# Patient Record
Sex: MALE | Race: WHITE | NOT HISPANIC OR LATINO | URBAN - METROPOLITAN AREA
[De-identification: names, ages, dates, MRNs, and addresses within clinical notes are randomized per-mention and may not be internally consistent; named-entity substitution may affect disease eponyms.]

---

## 2023-12-16 ENCOUNTER — HOSPITAL ENCOUNTER (OUTPATIENT)
Dept: RADIOLOGY | Facility: MEDICAL CENTER | Age: 48
End: 2023-12-16

## 2023-12-16 ENCOUNTER — HOSPITAL ENCOUNTER (EMERGENCY)
Facility: MEDICAL CENTER | Age: 48
End: 2023-12-17
Attending: STUDENT IN AN ORGANIZED HEALTH CARE EDUCATION/TRAINING PROGRAM

## 2023-12-16 VITALS
OXYGEN SATURATION: 93 % | TEMPERATURE: 98.7 F | RESPIRATION RATE: 16 BRPM | BODY MASS INDEX: 25.31 KG/M2 | DIASTOLIC BLOOD PRESSURE: 80 MMHG | HEART RATE: 93 BPM | SYSTOLIC BLOOD PRESSURE: 118 MMHG | HEIGHT: 68 IN | WEIGHT: 167 LBS

## 2023-12-16 DIAGNOSIS — J36 PERITONSILLAR ABSCESS: Primary | ICD-10-CM

## 2023-12-16 LAB
ALBUMIN SERPL BCP-MCNC: 4.4 G/DL (ref 3.2–4.9)
ALBUMIN/GLOB SERPL: 1.6 G/DL
ALP SERPL-CCNC: 73 U/L (ref 30–99)
ALT SERPL-CCNC: 30 U/L (ref 2–50)
ANION GAP SERPL CALC-SCNC: 11 MMOL/L (ref 7–16)
AST SERPL-CCNC: 17 U/L (ref 12–45)
BASOPHILS # BLD AUTO: 0.2 % (ref 0–1.8)
BASOPHILS # BLD: 0.02 K/UL (ref 0–0.12)
BILIRUB SERPL-MCNC: 0.9 MG/DL (ref 0.1–1.5)
BUN SERPL-MCNC: 14 MG/DL (ref 8–22)
CALCIUM ALBUM COR SERPL-MCNC: 9.1 MG/DL (ref 8.5–10.5)
CALCIUM SERPL-MCNC: 9.4 MG/DL (ref 8.5–10.5)
CHLORIDE SERPL-SCNC: 100 MMOL/L (ref 96–112)
CO2 SERPL-SCNC: 23 MMOL/L (ref 20–33)
CREAT SERPL-MCNC: 0.64 MG/DL (ref 0.5–1.4)
EOSINOPHIL # BLD AUTO: 0 K/UL (ref 0–0.51)
EOSINOPHIL NFR BLD: 0 % (ref 0–6.9)
ERYTHROCYTE [DISTWIDTH] IN BLOOD BY AUTOMATED COUNT: 46.2 FL (ref 35.9–50)
GFR SERPLBLD CREATININE-BSD FMLA CKD-EPI: 116 ML/MIN/1.73 M 2
GLOBULIN SER CALC-MCNC: 2.8 G/DL (ref 1.9–3.5)
GLUCOSE SERPL-MCNC: 145 MG/DL (ref 65–99)
HCT VFR BLD AUTO: 43.3 % (ref 42–52)
HGB BLD-MCNC: 15.1 G/DL (ref 14–18)
IMM GRANULOCYTES # BLD AUTO: 0.05 K/UL (ref 0–0.11)
IMM GRANULOCYTES NFR BLD AUTO: 0.4 % (ref 0–0.9)
LYMPHOCYTES # BLD AUTO: 0.56 K/UL (ref 1–4.8)
LYMPHOCYTES NFR BLD: 4.3 % (ref 22–41)
MCH RBC QN AUTO: 32.5 PG (ref 27–33)
MCHC RBC AUTO-ENTMCNC: 34.9 G/DL (ref 32.3–36.5)
MCV RBC AUTO: 93.3 FL (ref 81.4–97.8)
MONOCYTES # BLD AUTO: 0.18 K/UL (ref 0–0.85)
MONOCYTES NFR BLD AUTO: 1.4 % (ref 0–13.4)
NEUTROPHILS # BLD AUTO: 12.08 K/UL (ref 1.82–7.42)
NEUTROPHILS NFR BLD: 93.7 % (ref 44–72)
NRBC # BLD AUTO: 0 K/UL
NRBC BLD-RTO: 0 /100 WBC (ref 0–0.2)
PLATELET # BLD AUTO: 358 K/UL (ref 164–446)
PMV BLD AUTO: 9.4 FL (ref 9–12.9)
POTASSIUM SERPL-SCNC: 4.2 MMOL/L (ref 3.6–5.5)
PROT SERPL-MCNC: 7.2 G/DL (ref 6–8.2)
RBC # BLD AUTO: 4.64 M/UL (ref 4.7–6.1)
SODIUM SERPL-SCNC: 134 MMOL/L (ref 135–145)
WBC # BLD AUTO: 12.9 K/UL (ref 4.8–10.8)

## 2023-12-16 PROCEDURE — 85025 COMPLETE CBC W/AUTO DIFF WBC: CPT

## 2023-12-16 PROCEDURE — 99284 EMERGENCY DEPT VISIT MOD MDM: CPT

## 2023-12-16 PROCEDURE — 36415 COLL VENOUS BLD VENIPUNCTURE: CPT

## 2023-12-16 PROCEDURE — A9270 NON-COVERED ITEM OR SERVICE: HCPCS | Performed by: STUDENT IN AN ORGANIZED HEALTH CARE EDUCATION/TRAINING PROGRAM

## 2023-12-16 PROCEDURE — 700102 HCHG RX REV CODE 250 W/ 637 OVERRIDE(OP): Performed by: STUDENT IN AN ORGANIZED HEALTH CARE EDUCATION/TRAINING PROGRAM

## 2023-12-16 PROCEDURE — 80053 COMPREHEN METABOLIC PANEL: CPT

## 2023-12-16 PROCEDURE — 42700 I&D ABSCESS PERITONSILLAR: CPT

## 2023-12-16 PROCEDURE — 700101 HCHG RX REV CODE 250: Performed by: STUDENT IN AN ORGANIZED HEALTH CARE EDUCATION/TRAINING PROGRAM

## 2023-12-16 RX ORDER — LIDOCAINE HYDROCHLORIDE AND EPINEPHRINE 10; 10 MG/ML; UG/ML
20 INJECTION, SOLUTION INFILTRATION; PERINEURAL ONCE
Status: COMPLETED | OUTPATIENT
Start: 2023-12-16 | End: 2023-12-16

## 2023-12-16 RX ORDER — AMOXICILLIN AND CLAVULANATE POTASSIUM 875; 125 MG/1; MG/1
1 TABLET, FILM COATED ORAL 2 TIMES DAILY
Qty: 14 TABLET | Refills: 0 | Status: ACTIVE | OUTPATIENT
Start: 2023-12-16 | End: 2023-12-23

## 2023-12-16 RX ORDER — AMOXICILLIN AND CLAVULANATE POTASSIUM 875; 125 MG/1; MG/1
1 TABLET, FILM COATED ORAL ONCE
Status: DISCONTINUED | OUTPATIENT
Start: 2023-12-17 | End: 2023-12-17 | Stop reason: HOSPADM

## 2023-12-16 RX ADMIN — LIDOCAINE HYDROCHLORIDE AND EPINEPHRINE 20 ML: 10; 10 INJECTION, SOLUTION INFILTRATION; PERINEURAL at 22:00

## 2023-12-16 RX ADMIN — BENZOCAINE, BUTAMBEN, AND TETRACAINE HYDROCHLORIDE 1 SPRAY: .028; .004; .004 AEROSOL, SPRAY TOPICAL at 22:00

## 2023-12-16 ASSESSMENT — PAIN DESCRIPTION - PAIN TYPE: TYPE: ACUTE PAIN

## 2023-12-17 NOTE — CONSULTS
DATE OF SERVICE:  12/16/2023     PRINCIPAL COMPLAINT:  Sore throat.     HISTORY OF PRESENT ILLNESS:  This is a 48-year-old male who was seen in   [facility] with concern of peritonsillar abscess who scanned there and   noted to have like a 1x0.7 cm abscess in the tonsil with edema of the   surrounding area.  He states he never has had a problem with this before.     MEDICATIONS:  He does not take medications day-to-day.     ALLERGIES:  He has no known drug allergies.     PHYSICAL EXAMINATION:  GENERAL:  He is sitting in the bed, in no distress.  He is complaining of some   throat pain and slightly muffled voice.  HEENT:  Both ears are grossly normal.  The nose is patent.  The mouth is pink   and moist.  He actually has good opening of the mouth. He does have shift of   the right anterior pillar over the tonsil with some erythema.  NECK:  Soft and supple but he complains of pain with palpation of the neck.    No significant lymphadenopathy noted.     DIAGNOSTIC DATA:  CT scan was reviewed that showed once again a small abscess with some phlegmon around the tonsil in the peritonsillar   area.     PLAN:  Given the findings, recommend that he be drained.  This was discussed   with him and this was done at the bedside without difficulty.  He be sent home   on Augmentin and instructed to make sure he takes all his antibiotics.  He   can follow up with me as needed.        ______________________________  MD MELLISA Tomlin/SANTOS/ASHLEY    DD:  12/16/2023 23:00  DT:  12/16/2023 23:40    Job#:  [id number]

## 2023-12-17 NOTE — DISCHARGE INSTRUCTIONS
Take the antibiotics as prescribed, return if you develop worsening symptoms despite antibiotics, or having difficulty breathing, other new symptoms you find concerning

## 2023-12-17 NOTE — ED PROVIDER NOTES
"ED Provider Note    CHIEF COMPLAINT  Chief Complaint   Patient presents with    Oral Swelling     Pt transferred by EMS from [facility] for R sided peritonsillar abscess.       EXTERNAL RECORDS REVIEWED  External ED Note : [facility], mild leukocytosis, 11.6.    HPI/ROS  LIMITATION TO HISTORY   Select: : None      Rajiv Johansen is a 48 y.o. male who presents for large 7 cm right peritonsillar abscess, sore throat for the past 2 days, mass effect today last p.o. was yesterday evening, coffee today, transferred from [facility] [place] for ENT consultation and drainage received Unasyn and dexamethasone 10 mg prior to transport.  Reports swelling is significantly improved after IV medication.    PAST MEDICAL HISTORY       SURGICAL HISTORY  patient denies any surgical history    FAMILY HISTORY  History reviewed. No pertinent family history.    SOCIAL HISTORY  Social History     Tobacco Use    Smoking status: Every Day     Current packs/day: 0.50     Average packs/day: 0.5 packs/day for 30.0 years (15.0 ttl pk-yrs)     Types: Cigarettes     Start date: 1994    Smokeless tobacco: Never   Substance and Sexual Activity    Alcohol use: Yes     Comment: pt reports distant Hx of alcohol abuse.    Drug use: No    Sexual activity: Not on file       CURRENT MEDICATIONS  Home Medications    **Home medications have not yet been reviewed for this encounter**         ALLERGIES  No Known Allergies    PHYSICAL EXAM  VITAL SIGNS: /80   Pulse 93   Temp 37.1 °C (98.7 °F) (Temporal)   Resp 16   Ht 1.727 m (5' 8\")   Wt 75.8 kg (167 lb)   SpO2 93%   BMI 25.39 kg/m²    General: Pleasant male, no respiratory distress handling secretions  Head: Normocephalic atraumatic  Eyes: Extraocular motion intact  HEENT: Oropharynx is widely patent, there is uvular deviation to the left, with mass effect to the right posterior oropharynx.  No trismus no pain with range of motion of neck  Neck: Supple, no rigidity  Cardiovascular: " Regular rate and rhythm no murmurs rubs or gallops  Respiratory: Clear to auscultation bilaterally, equal chest rise and fall, no increased work of breathing  Abdomen: Soft nontender no guarding  Musculoskeletal: Warm and well perfused, no peripheral edema  Neuro: Alert, no focal deficits  Integumentary: No wounds or rashes      DIAGNOSTIC STUDIES / PROCEDURES        Labs:    Leukocytosis in the setting of infection and steroid use    RADIOLOGY  Reviewed CT neck from outside facility showed large PTA.     COURSE & MEDICAL DECISION MAKING        INITIAL ASSESSMENT, COURSE AND PLAN  Care Narrative: 48-year-old with history of polysubstance use, presenting with sore throat for the past couple days found to have PTA at outside hospital seen here for ENT consult.  Vital signs are reassuring.  Exam is notable for right peritonsillar abscess with no stridor patient tolerating secretions without difficulty.        DISPOSITION AND DISCUSSIONS  I have discussed management of the patient with the following physicians and DELGADO's: Dr. Nicolas, ENT evaluated the patient, she feels imaging is mostly showing reactive edema and not abscess, she performed I&D at the bedside, patient is feeling better and he will be discharged on antibiotics after discussion with consultant.    Patient was handed a paper prescription by myself.  We discussed return precautions patient verbalized understanding and agreement    Discussion of management with other [facility] or appropriate source(s): Pharmacy discussed oral analgesic spray options       Escalation of care considered, and ultimately not performed:acute inpatient care management, however at this time, the patient is most appropriate for outpatient management    Barriers to care at this time, including but not limited to:  Polysubstance use disorder .     Decision tools and prescription drugs considered including, but not limited to: Antibiotics augmentin .    FINAL DIAGNOSIS  1. Peritonsillar  abscess           Electronically signed by: Yomi William M.D., 12/16/2023 10:15 PM

## 2023-12-17 NOTE — ED NOTES
Reviewed discharge instructions, pt verbalized understanding of follow up with ENT and PCP. All Rx/OTC medications reviewed with pt who voices understanding of augmentin use paper Rx in hand. Pt encouraged to return to the ED for fevers, vomiting, inability to swallow, or any other new/concerning symptoms. IV discontinued and pt able to ambulate to the lobby with steady gait. Pt reports his ride is waiting and he does not want to wait for Augmentin in hospital.

## 2023-12-17 NOTE — ED TRIAGE NOTES
"Chief Complaint   Patient presents with    Oral Swelling     Pt transferred by EMS from [facility] for R sided peritonsillar abscess.   /83   Pulse 79   Temp 37.2 °C (98.9 °F) (Temporal)   Resp 18   Ht 1.727 m (5' 8\")   Wt 75.8 kg (167 lb)   SpO2 94%   BMI 25.39 kg/m²     Pt presents with swelling in oropharynx with uvula being pushed to the L. Pt is able to speak in full sentences and able to clear secretions. MD at bedside.   "

## 2023-12-17 NOTE — PROCEDURES
DATE OF PROCEDURE:  12/16/2023     PREPROCEDURE DIAGNOSIS:  Peritonsillar abscess.     POSTPROCEDURE DIAGNOSIS:  Peritonsillar abscess.     PROCEDURE:  Incision and drainage of peritonsillar abscess.     ATTENDING PHYSICIAN:  Erin Nicolas MD     ANESTHESIA:  Local.     PROCEDURE IN DETAIL: The patient was appropriately identified after which he   was sprayed topically with Cetacaine spray.  This was left for several minutes   and then he was injected with 1% lidocaine with epinephrine in the anterior   peritonsillar area.  A total of 6 mL was used.  After allowing for local time,   a small incision was made in the anterior pillar and then opened using a   curved hemostat.  This is opened up into the tonsillar area with small amount   of purulence seen.  He tolerated this well and was instructed to call or   return if problems arise.        ______________________________  Erin Nicolas MD    CWG/SANTOS    DD:  12/16/2023 23:01  DT:  12/16/2023 23:17    Job#:  [id number]

## 2023-12-20 ENCOUNTER — HOSPITAL ENCOUNTER (EMERGENCY)
Facility: MEDICAL CENTER | Age: 48
End: 2023-12-20

## 2024-04-22 ENCOUNTER — APPOINTMENT (OUTPATIENT)
Dept: RADIOLOGY | Facility: MEDICAL CENTER | Age: 49
End: 2024-04-22
Attending: EMERGENCY MEDICINE
Payer: COMMERCIAL

## 2024-04-22 ENCOUNTER — HOSPITAL ENCOUNTER (EMERGENCY)
Facility: MEDICAL CENTER | Age: 49
End: 2024-04-22
Attending: EMERGENCY MEDICINE
Payer: COMMERCIAL

## 2024-04-22 VITALS
OXYGEN SATURATION: 93 % | HEIGHT: 68 IN | TEMPERATURE: 97.5 F | SYSTOLIC BLOOD PRESSURE: 142 MMHG | WEIGHT: 160 LBS | DIASTOLIC BLOOD PRESSURE: 89 MMHG | BODY MASS INDEX: 24.25 KG/M2 | HEART RATE: 66 BPM | RESPIRATION RATE: 16 BRPM

## 2024-04-22 DIAGNOSIS — S86.912A STRAIN OF LEFT KNEE, INITIAL ENCOUNTER: ICD-10-CM

## 2024-04-22 LAB
ALBUMIN SERPL BCP-MCNC: 4 G/DL (ref 3.2–4.9)
ALBUMIN/GLOB SERPL: 1.9 G/DL
ALP SERPL-CCNC: 65 U/L (ref 30–99)
ALT SERPL-CCNC: 15 U/L (ref 2–50)
ANION GAP SERPL CALC-SCNC: 11 MMOL/L (ref 7–16)
AST SERPL-CCNC: 15 U/L (ref 12–45)
BASOPHILS # BLD AUTO: 0.2 % (ref 0–1.8)
BASOPHILS # BLD: 0.02 K/UL (ref 0–0.12)
BILIRUB SERPL-MCNC: 0.8 MG/DL (ref 0.1–1.5)
BUN SERPL-MCNC: 16 MG/DL (ref 8–22)
CALCIUM ALBUM COR SERPL-MCNC: 8.7 MG/DL (ref 8.5–10.5)
CALCIUM SERPL-MCNC: 8.7 MG/DL (ref 8.5–10.5)
CHLORIDE SERPL-SCNC: 102 MMOL/L (ref 96–112)
CO2 SERPL-SCNC: 22 MMOL/L (ref 20–33)
CREAT SERPL-MCNC: 0.51 MG/DL (ref 0.5–1.4)
CRP SERPL HS-MCNC: 0.77 MG/DL (ref 0–0.75)
EOSINOPHIL # BLD AUTO: 0.09 K/UL (ref 0–0.51)
EOSINOPHIL NFR BLD: 1.1 % (ref 0–6.9)
ERYTHROCYTE [DISTWIDTH] IN BLOOD BY AUTOMATED COUNT: 45.1 FL (ref 35.9–50)
ERYTHROCYTE [SEDIMENTATION RATE] IN BLOOD BY WESTERGREN METHOD: 3 MM/HOUR (ref 0–20)
GFR SERPLBLD CREATININE-BSD FMLA CKD-EPI: 123 ML/MIN/1.73 M 2
GLOBULIN SER CALC-MCNC: 2.1 G/DL (ref 1.9–3.5)
GLUCOSE SERPL-MCNC: 97 MG/DL (ref 65–99)
HCT VFR BLD AUTO: 39.4 % (ref 42–52)
HGB BLD-MCNC: 13.7 G/DL (ref 14–18)
IMM GRANULOCYTES # BLD AUTO: 0.02 K/UL (ref 0–0.11)
IMM GRANULOCYTES NFR BLD AUTO: 0.2 % (ref 0–0.9)
LACTATE SERPL-SCNC: 1.5 MMOL/L (ref 0.5–2)
LYMPHOCYTES # BLD AUTO: 1.83 K/UL (ref 1–4.8)
LYMPHOCYTES NFR BLD: 21.4 % (ref 22–41)
MCH RBC QN AUTO: 32.3 PG (ref 27–33)
MCHC RBC AUTO-ENTMCNC: 34.8 G/DL (ref 32.3–36.5)
MCV RBC AUTO: 92.9 FL (ref 81.4–97.8)
MONOCYTES # BLD AUTO: 0.87 K/UL (ref 0–0.85)
MONOCYTES NFR BLD AUTO: 10.2 % (ref 0–13.4)
NEUTROPHILS # BLD AUTO: 5.74 K/UL (ref 1.82–7.42)
NEUTROPHILS NFR BLD: 66.9 % (ref 44–72)
NRBC # BLD AUTO: 0 K/UL
NRBC BLD-RTO: 0 /100 WBC (ref 0–0.2)
PLATELET # BLD AUTO: 309 K/UL (ref 164–446)
PMV BLD AUTO: 9.4 FL (ref 9–12.9)
POTASSIUM SERPL-SCNC: 3.9 MMOL/L (ref 3.6–5.5)
PROT SERPL-MCNC: 6.1 G/DL (ref 6–8.2)
RBC # BLD AUTO: 4.24 M/UL (ref 4.7–6.1)
SODIUM SERPL-SCNC: 135 MMOL/L (ref 135–145)
WBC # BLD AUTO: 8.6 K/UL (ref 4.8–10.8)

## 2024-04-22 PROCEDURE — 96376 TX/PRO/DX INJ SAME DRUG ADON: CPT

## 2024-04-22 PROCEDURE — 85025 COMPLETE CBC W/AUTO DIFF WBC: CPT

## 2024-04-22 PROCEDURE — 96375 TX/PRO/DX INJ NEW DRUG ADDON: CPT

## 2024-04-22 PROCEDURE — 73552 X-RAY EXAM OF FEMUR 2/>: CPT | Mod: LT

## 2024-04-22 PROCEDURE — 700111 HCHG RX REV CODE 636 W/ 250 OVERRIDE (IP): Mod: JZ | Performed by: EMERGENCY MEDICINE

## 2024-04-22 PROCEDURE — 80053 COMPREHEN METABOLIC PANEL: CPT

## 2024-04-22 PROCEDURE — 87040 BLOOD CULTURE FOR BACTERIA: CPT

## 2024-04-22 PROCEDURE — 96374 THER/PROPH/DIAG INJ IV PUSH: CPT

## 2024-04-22 PROCEDURE — 93971 EXTREMITY STUDY: CPT | Mod: LT

## 2024-04-22 PROCEDURE — 36415 COLL VENOUS BLD VENIPUNCTURE: CPT

## 2024-04-22 PROCEDURE — 83605 ASSAY OF LACTIC ACID: CPT

## 2024-04-22 PROCEDURE — 73562 X-RAY EXAM OF KNEE 3: CPT | Mod: LT

## 2024-04-22 PROCEDURE — 86140 C-REACTIVE PROTEIN: CPT

## 2024-04-22 PROCEDURE — 85652 RBC SED RATE AUTOMATED: CPT

## 2024-04-22 PROCEDURE — 99285 EMERGENCY DEPT VISIT HI MDM: CPT

## 2024-04-22 RX ORDER — KETOROLAC TROMETHAMINE 15 MG/ML
15 INJECTION, SOLUTION INTRAMUSCULAR; INTRAVENOUS ONCE
Status: COMPLETED | OUTPATIENT
Start: 2024-04-22 | End: 2024-04-22

## 2024-04-22 RX ORDER — MORPHINE SULFATE 4 MG/ML
4 INJECTION INTRAVENOUS ONCE
Status: COMPLETED | OUTPATIENT
Start: 2024-04-22 | End: 2024-04-22

## 2024-04-22 RX ORDER — ONDANSETRON 2 MG/ML
4 INJECTION INTRAMUSCULAR; INTRAVENOUS ONCE
Status: COMPLETED | OUTPATIENT
Start: 2024-04-22 | End: 2024-04-22

## 2024-04-22 RX ADMIN — MORPHINE SULFATE 4 MG: 4 INJECTION INTRAVENOUS at 17:45

## 2024-04-22 RX ADMIN — KETOROLAC TROMETHAMINE 15 MG: 15 INJECTION, SOLUTION INTRAMUSCULAR; INTRAVENOUS at 19:53

## 2024-04-22 RX ADMIN — ONDANSETRON 4 MG: 2 INJECTION INTRAMUSCULAR; INTRAVENOUS at 14:46

## 2024-04-22 RX ADMIN — MORPHINE SULFATE 4 MG: 4 INJECTION INTRAVENOUS at 14:47

## 2024-04-22 ASSESSMENT — FIBROSIS 4 INDEX: FIB4 SCORE: 0.43

## 2024-04-22 NOTE — ED PROVIDER NOTES
"ER Provider Note    Scribed for Tahmina Mcknight M.d. by Camila Brown. 4/22/2024  2:24 PM    Primary Care Provider: Pcp Pt States None    CHIEF COMPLAINT   Chief Complaint   Patient presents with    Leg Pain     Left lower leg pain x1 day. Pt denies trauma or injury to the affected extremity, states he cannot bear any weight to the leg. CMS intact. Foot slightly cool to touch. Denies recent travel, denies hx of PE/DVT.      EXTERNAL RECORDS REVIEWED  The patient had a peritonsillar abscess in December.     HPI/ROS  LIMITATION TO HISTORY   Select: : None  OUTSIDE HISTORIAN(S):  None.    Rajiv Johansen is a 49 y.o. male who presents to the ED via EMS for evaluation of left knee pain onset 1 week ago. He describes that he jumped into a 4 foot trench at work and immediately felt pain to his left knee cap upon landing. The patient reports that he was able to ambulate after the incident. The patient continued to work his regular work week despite having pain with ambulation in that left knee up until Saturday when the pain got much worse. Yesterday, the patient states that he noticed swelling to his left knee and reports that he was not able to ambulate secondary to the pain. The patient states that he cannot bear any weight to his left leg. The patient states he also has left hamstring pain, but denies any back pain, chills, or fever.  No nausea or vomiting.  No muscle aches or bodyaches.  Patient says he does not feel sick or ill.  He reports a little bit of nausea but believes it is due to the pain.  Patient said he had \"a knee dislocation\" 30 years ago.  However, despite having old injury to that knee he does not typically have any knee pain.  He did not have any pain in that left knee until he jumped into the forefoot trench and felt the pain in his left knee.  No alleviating or exacerbating factors noted. The patient denies any recent travel. He also denies any history of DVT and PE.  Patient denies IV " "drug use.  Patient complained of a little bit of tingling to the bottom of his right foot.  No weakness of his leg.  No loss of bowel or bladder control.    PAST MEDICAL HISTORY  History reviewed. No pertinent past medical history.    SURGICAL HISTORY  History reviewed. No pertinent surgical history.    FAMILY HISTORY  History reviewed. No pertinent family history.    SOCIAL HISTORY   reports that he has been smoking cigarettes. He started smoking about 30 years ago. He has a 15.2 pack-year smoking history. He has never used smokeless tobacco. He reports current alcohol use. He reports that he does not use drugs.    CURRENT MEDICATIONS  No current outpatient medications     ALLERGIES  Patient has no known allergies.    PHYSICAL EXAM  /88   Pulse 71   Temp 36.7 °C (98 °F) (Temporal)   Resp 18   Ht 1.727 m (5' 8\")   Wt 72.6 kg (160 lb)   SpO2 97%   BMI 24.33 kg/m²       Constitutional: , Alert in mild distress.  HENT: Normocephalic, Bilateral external ears normal. Nose normal.   Eyes: Pupils are equal and reactive. Conjunctiva normal, non-icteric.   Thorax & Lungs: Easy unlabored respirations  Back: Nontender T-spine and L-spine midline without step-off or deformity.  Skin: Visualized skin is  Dry, No erythema, No rash.   Extremities:   Left lower extremity: There is some swelling to the left knee when compared to the right.  There is questionable palpable effusion to the medial aspect of the lower knee.  There is significant tenderness to the lateral aspect of the knee distinctly along the bone.  There is no significant tenderness along the medial or lateral joint line per se.  No tenderness with palpation of the patella.  There are some mild tenderness with palpation of the popliteal fossa.  There is no pretibial edema.  Calf is nontender.  2+ DP and PT pulses which are equal bilaterally.  Feet are of equal temperature.  There is some slight swelling to the left foot and toes when compared to the right. "  There is some mild warmth overlying the left knee when compared to the right.  There is no erythema overlying the left knee.  Patient has pain with flexion and extension of the knee.  Patient is able to fully extend the lower leg at the knee.  Patient has difficulty fully flexing the knee due to pain.  There is no tenderness over the patellar tendon or the quadricep tendon.  Neurologic: Alert, lower extremity strength are 5 out of 5 and equal bilaterally testing dorsiflexors and plantar flexors.  Speech is clear.  Sensation is intact to light touch.  Psychiatric: Affect and Mood normal      DIAGNOSTIC STUDIES    EKG/LABS  Results for orders placed or performed during the hospital encounter of 04/22/24   Lactic Acid   Result Value Ref Range    Lactic Acid 1.5 0.5 - 2.0 mmol/L   Comp Metabolic Panel   Result Value Ref Range    Sodium 135 135 - 145 mmol/L    Potassium 3.9 3.6 - 5.5 mmol/L    Chloride 102 96 - 112 mmol/L    Co2 22 20 - 33 mmol/L    Anion Gap 11.0 7.0 - 16.0    Glucose 97 65 - 99 mg/dL    Bun 16 8 - 22 mg/dL    Creatinine 0.51 0.50 - 1.40 mg/dL    Calcium 8.7 8.5 - 10.5 mg/dL    Correct Calcium 8.7 8.5 - 10.5 mg/dL    AST(SGOT) 15 12 - 45 U/L    ALT(SGPT) 15 2 - 50 U/L    Alkaline Phosphatase 65 30 - 99 U/L    Total Bilirubin 0.8 0.1 - 1.5 mg/dL    Albumin 4.0 3.2 - 4.9 g/dL    Total Protein 6.1 6.0 - 8.2 g/dL    Globulin 2.1 1.9 - 3.5 g/dL    A-G Ratio 1.9 g/dL   CRP QUANTITIVE (NON-CARDIAC)   Result Value Ref Range    Stat C-Reactive Protein 0.77 (H) 0.00 - 0.75 mg/dL   CBC WITH DIFFERENTIAL   Result Value Ref Range    WBC 8.6 4.8 - 10.8 K/uL    RBC 4.24 (L) 4.70 - 6.10 M/uL    Hemoglobin 13.7 (L) 14.0 - 18.0 g/dL    Hematocrit 39.4 (L) 42.0 - 52.0 %    MCV 92.9 81.4 - 97.8 fL    MCH 32.3 27.0 - 33.0 pg    MCHC 34.8 32.3 - 36.5 g/dL    RDW 45.1 35.9 - 50.0 fL    Platelet Count 309 164 - 446 K/uL    MPV 9.4 9.0 - 12.9 fL    Neutrophils-Polys 66.90 44.00 - 72.00 %    Lymphocytes 21.40 (L) 22.00 - 41.00  %    Monocytes 10.20 0.00 - 13.40 %    Eosinophils 1.10 0.00 - 6.90 %    Basophils 0.20 0.00 - 1.80 %    Immature Granulocytes 0.20 0.00 - 0.90 %    Nucleated RBC 0.00 0.00 - 0.20 /100 WBC    Neutrophils (Absolute) 5.74 1.82 - 7.42 K/uL    Lymphs (Absolute) 1.83 1.00 - 4.80 K/uL    Monos (Absolute) 0.87 (H) 0.00 - 0.85 K/uL    Eos (Absolute) 0.09 0.00 - 0.51 K/uL    Baso (Absolute) 0.02 0.00 - 0.12 K/uL    Immature Granulocytes (abs) 0.02 0.00 - 0.11 K/uL    NRBC (Absolute) 0.00 K/uL   Sed Rate   Result Value Ref Range    Sed Rate Westergren 3 0 - 20 mm/hour   ESTIMATED GFR   Result Value Ref Range    GFR (CKD-EPI) 123 >60 mL/min/1.73 m 2   I have independently interpreted this EKG      RADIOLOGY/PROCEDURES   The attending emergency physician has independently interpreted the diagnostic imaging associated with this visit and am waiting the final reading from the radiologist.     My preliminary interpretation is a follows: ER MD is reviewed the patient's knee x-ray.  There appears to be some degenerative change but no obvious fracture.    Radiologist interpretation:  US-EXTREMITY VENOUS LOWER UNILAT LEFT   Final Result      DX-KNEE 3 VIEWS LEFT   Final Result      1.  No definite acute fracture detected.   2.  Chronic appearing ossification along the lateral aspect of the lateral femoral condyle. This could be due to old collateral ligament injury. It does not appear likely acute. Correlate for any focal tenderness in that region to suggest acuity.      DX-FEMUR-2+ LEFT   Final Result      No definite fracture or dislocation.          COURSE & MEDICAL DECISION MAKING     ASSESSMENT, COURSE AND PLAN  Care Narrative: Patient presents to the ER with complaint of left knee pain which began 1 week ago after he jumped into a 4 foot trench while at work.  He said he had instant pain in the front part of his knee when he hit the ground.  Since then he has had persistent knee pain.  It is progressively gotten worse.  2 days  "ago got much worse to the point where he was having a hard time walking.  He has history of a remote \"knee dislocation\" about 30 years ago.  He says he really has not had any trouble with that knee up until last week when he jumped down into that trench.  He has not had fevers or chills.  No nausea or vomiting.  No myalgias.  He does not feel sick or ill.  No IV drug use.  Upon arrival patient was complaining of quite a bit of pain.  He did have some mild warmth overlying the left knee when compared to the right.  There was some mild swelling to the left knee when compared to the right.  Is some tenderness in the popliteal fossa.  He had exquisite point tenderness right along the lateral bony region of the knee.  There was no significant tenderness along the joint line itself.  Patient had a little bit of swelling to his toes and feet but he had good capillary refill and he had 2+ DP and PT pulses which are equal bilaterally.  There is no concern for arterial compromise at this time.  Patient has some discomfort with attempt to flex and extend his knee.  He was given some pain medicine and he is now able to extend fully.  He has a hard time fully bending however.  X-rays are negative for any acute fracture.  He has no pain in his back or in his hip.  There is no pain with range of motion of his hip.  There is no tenderness to palpation of his back.  He has no complaints of weakness of his legs.  No loss of bowel or bladder control.  Strength are all 5 out of 5 and equal bilaterally.  Sensation is intact to light touch.  Ultrasound is negative.  There is no DVT.  I considered the possibility of septic arthritis given the fact that there was some mild warmth overlying the knee with some mild swelling of that left knee when compared to the right.  Patient has not had fever.  No chills.  He does not feel sick.  No myalgias.  White count is normal.  Sed rate is normal.  CRP is 2/100th  higher than upper limits of normal. "  Bedside ultrasound does not reveal significant effusion.  There is a small sliver of fluid seen superiorly which may be accessible via the upper lateral aspect of the knee near the superior patella.  However, given normal labs, no fever, no erythema overlying the knee, and minimal effusion on x-ray, suspicion for septic arthritis is low and I think that trying to do an arthrocentesis at this time would place the patient at risk of infection when this is probably related to his injury and flareup of a osteoarthritic component of that knee.  I will send the patient home on some anti-inflammatories.  However, patient has been advised to stay off of the knee over the next 24 hours and to ice the knee.  He is to return here to the ER tomorrow for recheck.  At that time we can repeat a white blood cell count and some inflammatory markers and reevaluate his knee from a clinical standpoint.  If it looks like he is developing more swelling or a larger effusion, or if his  white count and inflammatory markers are creeping up, then he may need arthrocentesis at that time.  However, at this time I think the risk would outweigh the benefit.  I had a long discussion with the patient about this decision tree and the patient is amenable to deferring arthrocentesis at this time and returning tomorrow for recheck.  He is comfortable with this plan and agrees to return as recommended by ER MD.  Patient has been given strict return precautions and discharge instructions, however, he understands he gets worse in any way he must return to the ER immediately.  Patient understands treatment plan and follow-up.    2:24 PM - Patient seen and examined at bedside. This is a 49 year old man who presents with left knee pain onset 1 week ago. The patient reports that he jumped into a 4 foot trench at work last week and immediately felt pain to his left knee cap after landing. The patient notes that he was able to ambulate with pain afterwards,  but states that the pain has progressively gotten worse over that past week. The patient noticed swelling to his left knee yesterday and notes that he has not been able to ambulate since yesterday. Discussed plan of care, including performing lab work and imaging. Patient agrees to the plan of care. The patient will be medicated with Zofran 4 mg and morphine 4 mg.     8: 00 PM -bedside ultrasound was performed of the knee.  There is a sliver of fluid present which may be accessible only at the very superior aspect of the lateral patella.  No significant effusion noted.    8:13 PM - The patient was reevaluated at bedside. The patient was informed that there are no signs of fracture. All his inflammatory markers are reassuring and WBC is normal. The patient does have a minimal amount of fluid to his left knee, so I had a long discussion with the patient in regards to the risks and benefits of this procedure. The patient adds to history and states that he has been having some numbness to the back of is knee. Neurological exam was performed at bedside.  I instructed the patient to return to the emergency department tomorrow for a recheck unless he is feeling significantly improved and his knee feels better. Discussed discharge instructions and return precautions with the patient and they were cleared for discharge. Patient was given the opportunity to ask any further questions. He is comfortable with discharge at this time.           ADDITIONAL PROBLEM LIST  Problem #1: Left knee pain x 1 week, worse over the last 2 days.    DISPOSITION AND DISCUSSIONS  I have discussed management of the patient with the following physicians and DELGADO's:  None.    Discussion of management with other [facility] or appropriate source(s): None     Escalation of care considered, and ultimately not performed: Considered arthrocentesis, but the patient does not have any significant fluid on ultrasound.  He has not had fevers or chills.  There is no  redness overlying the knee.  White count is normal.  Sed rate is normal.  CRP is 2/100th over upper limits of normal.  At this time low suspicion for septic joint.  Will defer arthrocentesis at risk of arthrocentesis is likely higher than benefit given clinical exam, ultrasound findings, and laboratory evaluation.    Barriers to care at this time, including but not limited to: Patient does not have established PCP.     Decision tools and prescription drugs considered including, but not limited to: Antibiotics at this time no need for antibiotic is low suspicion for septic arthritis.  Will treat patient with anti-inflammatories for now. .    The patient will return for new or worsening symptoms and is stable at the time of discharge.    DISPOSITION:  Patient will be discharged home in stable condition.    FOLLOW UP:  Apollo Howard M.D.  [address] [facility] [id number] [phone]    Schedule an appointment as soon as possible for a visit in 2 days  If symptoms worsen return to ER    FINAL DIAGNOSIS  1. Strain of left knee, initial encounter Acute      Camila ARCHER (Scribe), am scribing for, and in the presence of, Tahmina Mcknight M.D..    Electronically signed by: Camila Brown (Scribe), 4/22/2024    ITahmina M.D. personally performed the services described in this documentation, as scribed by Camila Brown in my presence, and it is both accurate and complete.     This dictation has been created using voice recognition software. The accuracy of the dictation is limited by the abilities of the software. I expect there may be some errors of grammar and possibly content. I made every attempt to manually correct the errors within my dictation. However, errors related to voice recognition software may still exist and should be interpreted within the appropriate context.      The note accurately reflects work and decisions made by me.  Tahmina Mcknight M.D.   4/23/2024  12:22 AM

## 2024-04-22 NOTE — ED TRIAGE NOTES
Chief Complaint   Patient presents with    Leg Pain     Left lower leg pain x1 day. Pt denies trauma or injury to the affected extremity, states he cannot bear any weight to the leg. CMS intact. Foot slightly cool to touch. Denies recent travel, denies hx of PE/DVT.       10/10 pain.

## 2024-04-23 ENCOUNTER — HOSPITAL ENCOUNTER (EMERGENCY)
Facility: MEDICAL CENTER | Age: 49
End: 2024-04-23
Attending: EMERGENCY MEDICINE
Payer: COMMERCIAL

## 2024-04-23 VITALS
DIASTOLIC BLOOD PRESSURE: 63 MMHG | OXYGEN SATURATION: 96 % | HEIGHT: 68 IN | TEMPERATURE: 97.6 F | RESPIRATION RATE: 14 BRPM | WEIGHT: 158 LBS | SYSTOLIC BLOOD PRESSURE: 127 MMHG | HEART RATE: 79 BPM | BODY MASS INDEX: 23.95 KG/M2

## 2024-04-23 DIAGNOSIS — M25.562 ACUTE PAIN OF LEFT KNEE: ICD-10-CM

## 2024-04-23 PROCEDURE — 99284 EMERGENCY DEPT VISIT MOD MDM: CPT

## 2024-04-23 RX ORDER — NAPROXEN 500 MG/1
500 TABLET ORAL 2 TIMES DAILY WITH MEALS
Qty: 60 TABLET | Refills: 0 | Status: SHIPPED | OUTPATIENT
Start: 2024-04-23

## 2024-04-23 ASSESSMENT — FIBROSIS 4 INDEX: FIB4 SCORE: 0.63

## 2024-04-23 NOTE — ED NOTES
Pt in WC with Knee immobilizer in place on Left knee.  Pt reports being here last night for same injury and was told he would be given pain medicine and antiinflammatories.  He reports not receiving RX for either.  Pt placed on Monitor and gown, awaiting ERP

## 2024-04-23 NOTE — DISCHARGE INSTRUCTIONS
Follow-up with [facility] as discussed.  Weight-bear as tolerated with crutches.  Take the naproxen as prescribed.

## 2024-04-23 NOTE — ED NOTES
DC instructions reviewed with pt.  IV removed, pt updated on POC and RX. Pt reports having crutches at home and believes those will help.  RN offered another set, pt declined.  Pt getting dressed by self in room.

## 2024-04-23 NOTE — ED PROVIDER NOTES
ED Provider Note    CHIEF COMPLAINT  Chief Complaint   Patient presents with    Leg Pain     Pt BIB EMS to triage. 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 Pt reports since leg/knee pina/swelling has gotten worse and now also having some numbness. Leg is cold to touch, cap refill WNL. Pt given 1mg Versed, 75mcg Fentanyl PTA by EMS.      Knee Pain    Knee Swelling       EXTERNAL RECORDS REVIEWED  Other reviewed the patient's laboratory analysis from yesterday with no leukocytosis    HPI/ROS    Rajiv Johansen is a 49 y.o. male who presents with left knee pain.  The patient states he jumped into a trench at work and landed wrong hurting his left knee.  He has had some recurrent left knee pain for quite some time.  The patient was evaluated here yesterday and had an extensive workup with no clear source.  He has not been taking any medication at home.  The patient did receive Versed and fentanyl prior to my exam.  The patient has not had any associated fevers.  He has not followed up with orthopedics.    PAST MEDICAL HISTORY       SURGICAL HISTORY  patient denies any surgical history    FAMILY HISTORY  No family history on file.    SOCIAL HISTORY  Social History     Tobacco Use    Smoking status: Every Day     Current packs/day: 0.50     Average packs/day: 0.5 packs/day for 30.3 years (15.2 ttl pk-yrs)     Types: Cigarettes     Start date: 1994    Smokeless tobacco: Never   Substance and Sexual Activity    Alcohol use: Yes     Comment: pt reports distant Hx of alcohol abuse.    Drug use: No    Sexual activity: Not on file       CURRENT MEDICATIONS  Home Medications       Reviewed by Nirmal Cardenas R.N. (Registered Nurse) on 04/23/24 at 1050  Med List Status: Not Addressed     Medication Last Dose Status        Patient Ady Taking any Medications                           ALLERGIES  No Known Allergies    PHYSICAL EXAM  VITAL SIGNS: /78   Pulse 65   Resp 14   Ht 1.727 m (5'  "8\")   Wt 71.7 kg (158 lb)   SpO2 100%   BMI 24.02 kg/m²    In general the patient does not appear toxic    Extremities the patient has diffuse tenderness throughout the left knee with a possible small effusion.  There is no surrounding erythema nor induration.  He has a normal left ankle and left hip exam.    Skin no erythema nor induration    Neurovascular examination is grossly intact to the left lower extremity      COURSE & MEDICAL DECISION MAKING    This is a 49-year-old male who presents with left knee pain.  On the clear source.  He did have extensive workup yesterday with normal laboratory analysis, bedside ultrasound that showed no significant effusion, and x-ray that showed no evidence of a fracture.  The patient also had an ultrasound that showed no evidence of a DVT.  The patient will be placed on naproxen and he will continue utilize his knee brace and weight-bear as tolerated with crutches.  I will refer the patient back to orthopedics for repeat exam in 48 to 72 hours.    FINAL DIAGNOSIS  Left knee pain    Disposition  Patient will be discharged in stable condition       Electronically signed by: Alli Posey M.D., 4/23/2024 1:32 PM      "

## 2024-04-23 NOTE — ED TRIAGE NOTES
Chief Complaint   Patient presents with    Leg Pain     Pt BIB EMS to triage. 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 Pt reports since leg/knee pina/swelling has gotten worse and now also having some numbness. Leg is cold to touch, cap refill WNL. Pt given 1mg Versed, 75mcg Fentanyl PTA by EMS.      Knee Pain    Knee Swelling     Explained to pt triage process, made pt aware to tell this RN/staff of any changes/concerns, pt verbalized understanding of process and instructions given. Pt to ER lobby.

## 2024-04-23 NOTE — DISCHARGE INSTRUCTIONS
Return to the ER tomorrow for a recheck unless you are feeling significantly improved and your knee is feeling much better.      Return to the ER immediately for any worsening knee swelling, worsening knee pain, warmth or redness overlying the knee, fevers over 100.4, shaking chills, muscle aches or bodyaches, or for any concerns/worsening.    Use ice to help with the pain and swelling.    Wear your knee immobilizer and use crutches to avoid weightbearing.    Follow-up with Dr. Howard, orthopedic surgeon, within the next 2 to 3 days.  Please call for appointment

## 2024-04-23 NOTE — ED NOTES
Break RN  Discharge instructions given to patient, a verbal understanding of all instructions was stated. IV removed, cathlon intact, site without s/s of infection. Pt preferred to be taken by wheel chair out accompanied by self VSS, all belongings accounted for.

## 2024-04-27 LAB
BACTERIA BLD CULT: NORMAL
SIGNIFICANT IND 70042: NORMAL
SITE SITE: NORMAL
SOURCE SOURCE: NORMAL

## 2024-11-01 ENCOUNTER — APPOINTMENT (OUTPATIENT)
Dept: RADIOLOGY | Facility: MEDICAL CENTER | Age: 49
DRG: 661 | End: 2024-11-01
Attending: UROLOGY
Payer: COMMERCIAL

## 2024-11-01 ENCOUNTER — APPOINTMENT (OUTPATIENT)
Dept: RADIOLOGY | Facility: MEDICAL CENTER | Age: 49
DRG: 661 | End: 2024-11-01
Attending: EMERGENCY MEDICINE
Payer: COMMERCIAL

## 2024-11-01 ENCOUNTER — ANESTHESIA EVENT (OUTPATIENT)
Dept: SURGERY | Facility: MEDICAL CENTER | Age: 49
DRG: 661 | End: 2024-11-01
Payer: COMMERCIAL

## 2024-11-01 ENCOUNTER — ANESTHESIA (OUTPATIENT)
Dept: SURGERY | Facility: MEDICAL CENTER | Age: 49
DRG: 661 | End: 2024-11-01
Payer: COMMERCIAL

## 2024-11-01 ENCOUNTER — HOSPITAL ENCOUNTER (INPATIENT)
Facility: MEDICAL CENTER | Age: 49
LOS: 1 days | DRG: 661 | End: 2024-11-02
Attending: EMERGENCY MEDICINE | Admitting: INTERNAL MEDICINE
Payer: COMMERCIAL

## 2024-11-01 DIAGNOSIS — N13.2 HYDRONEPHROSIS WITH URINARY OBSTRUCTION DUE TO URETERAL CALCULUS: ICD-10-CM

## 2024-11-01 DIAGNOSIS — N20.1 URETERAL STONE: ICD-10-CM

## 2024-11-01 PROBLEM — I63.9 CVA (CEREBRAL VASCULAR ACCIDENT) (HCC): Status: ACTIVE | Noted: 2024-11-01

## 2024-11-01 PROBLEM — F19.90 ILLICIT DRUG USE: Status: ACTIVE | Noted: 2024-11-01

## 2024-11-01 PROBLEM — R10.9 FLANK PAIN: Status: ACTIVE | Noted: 2024-11-01

## 2024-11-01 LAB
ALBUMIN SERPL BCP-MCNC: 4.4 G/DL (ref 3.2–4.9)
ALBUMIN/GLOB SERPL: 1.6 G/DL
ALP SERPL-CCNC: 63 U/L (ref 30–99)
ALT SERPL-CCNC: 17 U/L (ref 2–50)
ANION GAP SERPL CALC-SCNC: 11 MMOL/L (ref 7–16)
APPEARANCE UR: CLEAR
AST SERPL-CCNC: 26 U/L (ref 12–45)
BACTERIA #/AREA URNS HPF: ABNORMAL /HPF
BASOPHILS # BLD AUTO: 0.6 % (ref 0–1.8)
BASOPHILS # BLD: 0.06 K/UL (ref 0–0.12)
BILIRUB SERPL-MCNC: 0.9 MG/DL (ref 0.1–1.5)
BILIRUB UR QL STRIP.AUTO: NEGATIVE
BUN SERPL-MCNC: 24 MG/DL (ref 8–22)
CALCIUM ALBUM COR SERPL-MCNC: 9.4 MG/DL (ref 8.5–10.5)
CALCIUM SERPL-MCNC: 9.7 MG/DL (ref 8.5–10.5)
CASTS URNS QL MICRO: ABNORMAL /LPF (ref 0–2)
CHLORIDE SERPL-SCNC: 103 MMOL/L (ref 96–112)
CO2 SERPL-SCNC: 20 MMOL/L (ref 20–33)
COLOR UR: YELLOW
CREAT SERPL-MCNC: 1.06 MG/DL (ref 0.5–1.4)
EOSINOPHIL # BLD AUTO: 0.09 K/UL (ref 0–0.51)
EOSINOPHIL NFR BLD: 0.9 % (ref 0–6.9)
EPITHELIAL CELLS 1715: ABNORMAL /HPF (ref 0–5)
ERYTHROCYTE [DISTWIDTH] IN BLOOD BY AUTOMATED COUNT: 44.8 FL (ref 35.9–50)
GFR SERPLBLD CREATININE-BSD FMLA CKD-EPI: 85 ML/MIN/1.73 M 2
GLOBULIN SER CALC-MCNC: 2.7 G/DL (ref 1.9–3.5)
GLUCOSE SERPL-MCNC: 121 MG/DL (ref 65–99)
GLUCOSE UR STRIP.AUTO-MCNC: NEGATIVE MG/DL
HCT VFR BLD AUTO: 43.9 % (ref 42–52)
HGB BLD-MCNC: 15 G/DL (ref 14–18)
IMM GRANULOCYTES # BLD AUTO: 0.03 K/UL (ref 0–0.11)
IMM GRANULOCYTES NFR BLD AUTO: 0.3 % (ref 0–0.9)
KETONES UR STRIP.AUTO-MCNC: NEGATIVE MG/DL
LEUKOCYTE ESTERASE UR QL STRIP.AUTO: NEGATIVE
LIPASE SERPL-CCNC: 15 U/L (ref 11–82)
LYMPHOCYTES # BLD AUTO: 1.18 K/UL (ref 1–4.8)
LYMPHOCYTES NFR BLD: 11.7 % (ref 22–41)
MCH RBC QN AUTO: 31.8 PG (ref 27–33)
MCHC RBC AUTO-ENTMCNC: 34.2 G/DL (ref 32.3–36.5)
MCV RBC AUTO: 93.2 FL (ref 81.4–97.8)
MICRO URNS: ABNORMAL
MONOCYTES # BLD AUTO: 0.88 K/UL (ref 0–0.85)
MONOCYTES NFR BLD AUTO: 8.7 % (ref 0–13.4)
NEUTROPHILS # BLD AUTO: 7.87 K/UL (ref 1.82–7.42)
NEUTROPHILS NFR BLD: 77.8 % (ref 44–72)
NITRITE UR QL STRIP.AUTO: NEGATIVE
NRBC # BLD AUTO: 0 K/UL
NRBC BLD-RTO: 0 /100 WBC (ref 0–0.2)
PH UR STRIP.AUTO: 5.5 [PH] (ref 5–8)
PLATELET # BLD AUTO: 404 K/UL (ref 164–446)
PMV BLD AUTO: 8.9 FL (ref 9–12.9)
POTASSIUM SERPL-SCNC: 4.1 MMOL/L (ref 3.6–5.5)
PROT SERPL-MCNC: 7.1 G/DL (ref 6–8.2)
PROT UR QL STRIP: NEGATIVE MG/DL
RBC # BLD AUTO: 4.71 M/UL (ref 4.7–6.1)
RBC # URNS HPF: ABNORMAL /HPF (ref 0–2)
RBC UR QL AUTO: ABNORMAL
SODIUM SERPL-SCNC: 134 MMOL/L (ref 135–145)
SP GR UR STRIP.AUTO: 1.02
UROBILINOGEN UR STRIP.AUTO-MCNC: 0.2 EU/DL
WBC # BLD AUTO: 10.1 K/UL (ref 4.8–10.8)
WBC #/AREA URNS HPF: ABNORMAL /HPF

## 2024-11-01 PROCEDURE — 96375 TX/PRO/DX INJ NEW DRUG ADDON: CPT

## 2024-11-01 PROCEDURE — 81003 URINALYSIS AUTO W/O SCOPE: CPT

## 2024-11-01 PROCEDURE — 700102 HCHG RX REV CODE 250 W/ 637 OVERRIDE(OP): Performed by: STUDENT IN AN ORGANIZED HEALTH CARE EDUCATION/TRAINING PROGRAM

## 2024-11-01 PROCEDURE — 160009 HCHG ANES TIME/MIN: Performed by: UROLOGY

## 2024-11-01 PROCEDURE — 700105 HCHG RX REV CODE 258: Performed by: STUDENT IN AN ORGANIZED HEALTH CARE EDUCATION/TRAINING PROGRAM

## 2024-11-01 PROCEDURE — 700102 HCHG RX REV CODE 250 W/ 637 OVERRIDE(OP): Performed by: INTERNAL MEDICINE

## 2024-11-01 PROCEDURE — 80053 COMPREHEN METABOLIC PANEL: CPT

## 2024-11-01 PROCEDURE — 160002 HCHG RECOVERY MINUTES (STAT): Performed by: UROLOGY

## 2024-11-01 PROCEDURE — 96376 TX/PRO/DX INJ SAME DRUG ADON: CPT

## 2024-11-01 PROCEDURE — 700111 HCHG RX REV CODE 636 W/ 250 OVERRIDE (IP): Performed by: STUDENT IN AN ORGANIZED HEALTH CARE EDUCATION/TRAINING PROGRAM

## 2024-11-01 PROCEDURE — 770001 HCHG ROOM/CARE - MED/SURG/GYN PRIV*

## 2024-11-01 PROCEDURE — 36415 COLL VENOUS BLD VENIPUNCTURE: CPT

## 2024-11-01 PROCEDURE — 160039 HCHG SURGERY MINUTES - EA ADDL 1 MIN LEVEL 3: Performed by: UROLOGY

## 2024-11-01 PROCEDURE — C1747 HCHG SHELL REV 278 C1747: HCPCS | Performed by: UROLOGY

## 2024-11-01 PROCEDURE — 85025 COMPLETE CBC W/AUTO DIFF WBC: CPT

## 2024-11-01 PROCEDURE — 81015 MICROSCOPIC EXAM OF URINE: CPT

## 2024-11-01 PROCEDURE — 0TC78ZZ EXTIRPATION OF MATTER FROM LEFT URETER, VIA NATURAL OR ARTIFICIAL OPENING ENDOSCOPIC: ICD-10-PCS | Performed by: UROLOGY

## 2024-11-01 PROCEDURE — 160048 HCHG OR STATISTICAL LEVEL 1-5: Performed by: UROLOGY

## 2024-11-01 PROCEDURE — 160028 HCHG SURGERY MINUTES - 1ST 30 MINS LEVEL 3: Performed by: UROLOGY

## 2024-11-01 PROCEDURE — C2617 STENT, NON-COR, TEM W/O DEL: HCPCS | Performed by: UROLOGY

## 2024-11-01 PROCEDURE — 700111 HCHG RX REV CODE 636 W/ 250 OVERRIDE (IP): Mod: JZ | Performed by: EMERGENCY MEDICINE

## 2024-11-01 PROCEDURE — C1769 GUIDE WIRE: HCPCS | Performed by: UROLOGY

## 2024-11-01 PROCEDURE — 74176 CT ABD & PELVIS W/O CONTRAST: CPT

## 2024-11-01 PROCEDURE — 700101 HCHG RX REV CODE 250: Performed by: STUDENT IN AN ORGANIZED HEALTH CARE EDUCATION/TRAINING PROGRAM

## 2024-11-01 PROCEDURE — 160035 HCHG PACU - 1ST 60 MINS PHASE I: Performed by: UROLOGY

## 2024-11-01 PROCEDURE — 74018 RADEX ABDOMEN 1 VIEW: CPT

## 2024-11-01 PROCEDURE — 99285 EMERGENCY DEPT VISIT HI MDM: CPT

## 2024-11-01 PROCEDURE — 96374 THER/PROPH/DIAG INJ IV PUSH: CPT

## 2024-11-01 PROCEDURE — A9270 NON-COVERED ITEM OR SERVICE: HCPCS | Performed by: STUDENT IN AN ORGANIZED HEALTH CARE EDUCATION/TRAINING PROGRAM

## 2024-11-01 PROCEDURE — 700111 HCHG RX REV CODE 636 W/ 250 OVERRIDE (IP): Mod: JZ | Performed by: STUDENT IN AN ORGANIZED HEALTH CARE EDUCATION/TRAINING PROGRAM

## 2024-11-01 PROCEDURE — 99222 1ST HOSP IP/OBS MODERATE 55: CPT | Performed by: INTERNAL MEDICINE

## 2024-11-01 PROCEDURE — 83690 ASSAY OF LIPASE: CPT

## 2024-11-01 PROCEDURE — A9270 NON-COVERED ITEM OR SERVICE: HCPCS | Performed by: INTERNAL MEDICINE

## 2024-11-01 PROCEDURE — 0T778DZ DILATION OF LEFT URETER WITH INTRALUMINAL DEVICE, VIA NATURAL OR ARTIFICIAL OPENING ENDOSCOPIC: ICD-10-PCS | Performed by: UROLOGY

## 2024-11-01 DEVICE — STENT UROLOGICAL POLARIS 6X24 ULTRA: Type: IMPLANTABLE DEVICE | Site: URETER | Status: FUNCTIONAL

## 2024-11-01 RX ORDER — OXYCODONE HCL 5 MG/5 ML
10 SOLUTION, ORAL ORAL
Status: DISCONTINUED | OUTPATIENT
Start: 2024-11-01 | End: 2024-11-01 | Stop reason: HOSPADM

## 2024-11-01 RX ORDER — MIDAZOLAM HYDROCHLORIDE 1 MG/ML
INJECTION INTRAMUSCULAR; INTRAVENOUS PRN
Status: DISCONTINUED | OUTPATIENT
Start: 2024-11-01 | End: 2024-11-01 | Stop reason: SURG

## 2024-11-01 RX ORDER — ACETAMINOPHEN 325 MG/1
650 TABLET ORAL EVERY 6 HOURS PRN
Status: DISCONTINUED | OUTPATIENT
Start: 2024-11-01 | End: 2024-11-02 | Stop reason: HOSPADM

## 2024-11-01 RX ORDER — PROCHLORPERAZINE EDISYLATE 5 MG/ML
5-10 INJECTION INTRAMUSCULAR; INTRAVENOUS EVERY 4 HOURS PRN
Status: DISCONTINUED | OUTPATIENT
Start: 2024-11-01 | End: 2024-11-02 | Stop reason: HOSPADM

## 2024-11-01 RX ORDER — PROMETHAZINE HYDROCHLORIDE 25 MG/1
12.5-25 SUPPOSITORY RECTAL EVERY 4 HOURS PRN
Status: DISCONTINUED | OUTPATIENT
Start: 2024-11-01 | End: 2024-11-02 | Stop reason: HOSPADM

## 2024-11-01 RX ORDER — LABETALOL HYDROCHLORIDE 5 MG/ML
5 INJECTION, SOLUTION INTRAVENOUS
Status: DISCONTINUED | OUTPATIENT
Start: 2024-11-01 | End: 2024-11-01 | Stop reason: HOSPADM

## 2024-11-01 RX ORDER — ALBUTEROL SULFATE 5 MG/ML
2.5 SOLUTION RESPIRATORY (INHALATION)
Status: DISCONTINUED | OUTPATIENT
Start: 2024-11-01 | End: 2024-11-01 | Stop reason: HOSPADM

## 2024-11-01 RX ORDER — ONDANSETRON 2 MG/ML
4 INJECTION INTRAMUSCULAR; INTRAVENOUS ONCE
Status: COMPLETED | OUTPATIENT
Start: 2024-11-01 | End: 2024-11-01

## 2024-11-01 RX ORDER — HYDROMORPHONE HYDROCHLORIDE 1 MG/ML
0.4 INJECTION, SOLUTION INTRAMUSCULAR; INTRAVENOUS; SUBCUTANEOUS
Status: DISCONTINUED | OUTPATIENT
Start: 2024-11-01 | End: 2024-11-01 | Stop reason: HOSPADM

## 2024-11-01 RX ORDER — OXYCODONE HCL 5 MG/5 ML
5 SOLUTION, ORAL ORAL
Status: DISCONTINUED | OUTPATIENT
Start: 2024-11-01 | End: 2024-11-01 | Stop reason: HOSPADM

## 2024-11-01 RX ORDER — HYDROMORPHONE HYDROCHLORIDE 1 MG/ML
0.5 INJECTION, SOLUTION INTRAMUSCULAR; INTRAVENOUS; SUBCUTANEOUS
Status: DISCONTINUED | OUTPATIENT
Start: 2024-11-01 | End: 2024-11-02 | Stop reason: HOSPADM

## 2024-11-01 RX ORDER — OXYBUTYNIN CHLORIDE 5 MG/1
5 TABLET ORAL ONCE
Status: COMPLETED | OUTPATIENT
Start: 2024-11-01 | End: 2024-11-01

## 2024-11-01 RX ORDER — CEFAZOLIN SODIUM 1 G/3ML
INJECTION, POWDER, FOR SOLUTION INTRAMUSCULAR; INTRAVENOUS PRN
Status: DISCONTINUED | OUTPATIENT
Start: 2024-11-01 | End: 2024-11-01 | Stop reason: SURG

## 2024-11-01 RX ORDER — KETOROLAC TROMETHAMINE 15 MG/ML
15 INJECTION, SOLUTION INTRAMUSCULAR; INTRAVENOUS ONCE
Status: COMPLETED | OUTPATIENT
Start: 2024-11-01 | End: 2024-11-01

## 2024-11-01 RX ORDER — SODIUM CHLORIDE 9 MG/ML
INJECTION, SOLUTION INTRAVENOUS CONTINUOUS
Status: DISCONTINUED | OUTPATIENT
Start: 2024-11-01 | End: 2024-11-01

## 2024-11-01 RX ORDER — EPHEDRINE SULFATE 50 MG/ML
5 INJECTION, SOLUTION INTRAVENOUS
Status: DISCONTINUED | OUTPATIENT
Start: 2024-11-01 | End: 2024-11-01 | Stop reason: HOSPADM

## 2024-11-01 RX ORDER — ACETAMINOPHEN 500 MG
1000 TABLET ORAL ONCE
Status: COMPLETED | OUTPATIENT
Start: 2024-11-01 | End: 2024-11-01

## 2024-11-01 RX ORDER — OXYCODONE HYDROCHLORIDE 10 MG/1
10 TABLET ORAL
Status: DISCONTINUED | OUTPATIENT
Start: 2024-11-01 | End: 2024-11-02 | Stop reason: HOSPADM

## 2024-11-01 RX ORDER — DIPHENHYDRAMINE HYDROCHLORIDE 50 MG/ML
12.5 INJECTION INTRAMUSCULAR; INTRAVENOUS
Status: DISCONTINUED | OUTPATIENT
Start: 2024-11-01 | End: 2024-11-01 | Stop reason: HOSPADM

## 2024-11-01 RX ORDER — ONDANSETRON 2 MG/ML
4 INJECTION INTRAMUSCULAR; INTRAVENOUS EVERY 4 HOURS PRN
Status: DISCONTINUED | OUTPATIENT
Start: 2024-11-01 | End: 2024-11-02 | Stop reason: HOSPADM

## 2024-11-01 RX ORDER — HYDRALAZINE HYDROCHLORIDE 20 MG/ML
10 INJECTION INTRAMUSCULAR; INTRAVENOUS EVERY 4 HOURS PRN
Status: DISCONTINUED | OUTPATIENT
Start: 2024-11-01 | End: 2024-11-02 | Stop reason: HOSPADM

## 2024-11-01 RX ORDER — ONDANSETRON 2 MG/ML
INJECTION INTRAMUSCULAR; INTRAVENOUS PRN
Status: DISCONTINUED | OUTPATIENT
Start: 2024-11-01 | End: 2024-11-01 | Stop reason: SURG

## 2024-11-01 RX ORDER — PHENAZOPYRIDINE HYDROCHLORIDE 200 MG/1
200 TABLET, FILM COATED ORAL ONCE
Status: COMPLETED | OUTPATIENT
Start: 2024-11-01 | End: 2024-11-01

## 2024-11-01 RX ORDER — ROCURONIUM BROMIDE 10 MG/ML
INJECTION, SOLUTION INTRAVENOUS PRN
Status: DISCONTINUED | OUTPATIENT
Start: 2024-11-01 | End: 2024-11-01 | Stop reason: SURG

## 2024-11-01 RX ORDER — LIDOCAINE HYDROCHLORIDE 20 MG/ML
INJECTION, SOLUTION EPIDURAL; INFILTRATION; INTRACAUDAL; PERINEURAL PRN
Status: DISCONTINUED | OUTPATIENT
Start: 2024-11-01 | End: 2024-11-01 | Stop reason: SURG

## 2024-11-01 RX ORDER — HYDROMORPHONE HYDROCHLORIDE 1 MG/ML
0.5 INJECTION, SOLUTION INTRAMUSCULAR; INTRAVENOUS; SUBCUTANEOUS ONCE
Status: COMPLETED | OUTPATIENT
Start: 2024-11-01 | End: 2024-11-01

## 2024-11-01 RX ORDER — ONDANSETRON 2 MG/ML
4 INJECTION INTRAMUSCULAR; INTRAVENOUS
Status: DISCONTINUED | OUTPATIENT
Start: 2024-11-01 | End: 2024-11-01 | Stop reason: HOSPADM

## 2024-11-01 RX ORDER — HALOPERIDOL 5 MG/ML
1 INJECTION INTRAMUSCULAR
Status: DISCONTINUED | OUTPATIENT
Start: 2024-11-01 | End: 2024-11-01 | Stop reason: HOSPADM

## 2024-11-01 RX ORDER — OXYCODONE HYDROCHLORIDE 5 MG/1
5 TABLET ORAL
Status: DISCONTINUED | OUTPATIENT
Start: 2024-11-01 | End: 2024-11-02 | Stop reason: HOSPADM

## 2024-11-01 RX ORDER — HYDRALAZINE HYDROCHLORIDE 20 MG/ML
5 INJECTION INTRAMUSCULAR; INTRAVENOUS
Status: DISCONTINUED | OUTPATIENT
Start: 2024-11-01 | End: 2024-11-01 | Stop reason: HOSPADM

## 2024-11-01 RX ORDER — DEXAMETHASONE SODIUM PHOSPHATE 4 MG/ML
INJECTION, SOLUTION INTRA-ARTICULAR; INTRALESIONAL; INTRAMUSCULAR; INTRAVENOUS; SOFT TISSUE PRN
Status: DISCONTINUED | OUTPATIENT
Start: 2024-11-01 | End: 2024-11-01 | Stop reason: SURG

## 2024-11-01 RX ORDER — HYDROMORPHONE HYDROCHLORIDE 1 MG/ML
0.1 INJECTION, SOLUTION INTRAMUSCULAR; INTRAVENOUS; SUBCUTANEOUS
Status: DISCONTINUED | OUTPATIENT
Start: 2024-11-01 | End: 2024-11-01 | Stop reason: HOSPADM

## 2024-11-01 RX ORDER — HYDROMORPHONE HYDROCHLORIDE 1 MG/ML
0.2 INJECTION, SOLUTION INTRAMUSCULAR; INTRAVENOUS; SUBCUTANEOUS
Status: DISCONTINUED | OUTPATIENT
Start: 2024-11-01 | End: 2024-11-01 | Stop reason: HOSPADM

## 2024-11-01 RX ORDER — PROMETHAZINE HYDROCHLORIDE 25 MG/1
12.5-25 TABLET ORAL EVERY 4 HOURS PRN
Status: DISCONTINUED | OUTPATIENT
Start: 2024-11-01 | End: 2024-11-02 | Stop reason: HOSPADM

## 2024-11-01 RX ORDER — SODIUM CHLORIDE 9 MG/ML
INJECTION, SOLUTION INTRAVENOUS
Status: DISCONTINUED | OUTPATIENT
Start: 2024-11-01 | End: 2024-11-01 | Stop reason: SURG

## 2024-11-01 RX ORDER — ONDANSETRON 4 MG/1
4 TABLET, ORALLY DISINTEGRATING ORAL EVERY 4 HOURS PRN
Status: DISCONTINUED | OUTPATIENT
Start: 2024-11-01 | End: 2024-11-02 | Stop reason: HOSPADM

## 2024-11-01 RX ADMIN — ONDANSETRON 4 MG: 2 INJECTION INTRAMUSCULAR; INTRAVENOUS at 06:50

## 2024-11-01 RX ADMIN — FENTANYL CITRATE 100 MCG: 50 INJECTION, SOLUTION INTRAMUSCULAR; INTRAVENOUS at 07:33

## 2024-11-01 RX ADMIN — DEXAMETHASONE SODIUM PHOSPHATE 4 MG: 4 INJECTION INTRA-ARTICULAR; INTRALESIONAL; INTRAMUSCULAR; INTRAVENOUS; SOFT TISSUE at 13:30

## 2024-11-01 RX ADMIN — FENTANYL CITRATE 100 MCG: 50 INJECTION, SOLUTION INTRAMUSCULAR; INTRAVENOUS at 08:30

## 2024-11-01 RX ADMIN — KETOROLAC TROMETHAMINE 15 MG: 15 INJECTION, SOLUTION INTRAMUSCULAR; INTRAVENOUS at 14:23

## 2024-11-01 RX ADMIN — ACETAMINOPHEN 1000 MG: 500 TABLET ORAL at 14:57

## 2024-11-01 RX ADMIN — LIDOCAINE HYDROCHLORIDE 50 MG: 20 INJECTION, SOLUTION EPIDURAL; INFILTRATION; INTRACAUDAL at 13:21

## 2024-11-01 RX ADMIN — PROPOFOL 100 MG: 10 INJECTION, EMULSION INTRAVENOUS at 13:21

## 2024-11-01 RX ADMIN — ONDANSETRON 4 MG: 2 INJECTION INTRAMUSCULAR; INTRAVENOUS at 13:30

## 2024-11-01 RX ADMIN — SODIUM CHLORIDE: 9 INJECTION, SOLUTION INTRAVENOUS at 13:17

## 2024-11-01 RX ADMIN — SUGAMMADEX 200 MG: 100 INJECTION, SOLUTION INTRAVENOUS at 14:08

## 2024-11-01 RX ADMIN — OXYCODONE HYDROCHLORIDE 10 MG: 10 TABLET ORAL at 22:08

## 2024-11-01 RX ADMIN — KETOROLAC TROMETHAMINE 15 MG: 15 INJECTION, SOLUTION INTRAMUSCULAR; INTRAVENOUS at 06:50

## 2024-11-01 RX ADMIN — PHENAZOPYRIDINE 200 MG: 200 TABLET ORAL at 14:57

## 2024-11-01 RX ADMIN — HYDROMORPHONE HYDROCHLORIDE 0.5 MG: 1 INJECTION, SOLUTION INTRAMUSCULAR; INTRAVENOUS; SUBCUTANEOUS at 10:52

## 2024-11-01 RX ADMIN — OXYBUTYNIN CHLORIDE 5 MG: 5 TABLET ORAL at 14:57

## 2024-11-01 RX ADMIN — FENTANYL CITRATE 50 MCG: 50 INJECTION, SOLUTION INTRAMUSCULAR; INTRAVENOUS at 13:21

## 2024-11-01 RX ADMIN — MIDAZOLAM HYDROCHLORIDE 2 MG: 2 INJECTION, SOLUTION INTRAMUSCULAR; INTRAVENOUS at 13:19

## 2024-11-01 RX ADMIN — CEFAZOLIN 2 G: 1 INJECTION, POWDER, FOR SOLUTION INTRAMUSCULAR; INTRAVENOUS at 13:26

## 2024-11-01 RX ADMIN — ROCURONIUM BROMIDE 50 MG: 50 INJECTION, SOLUTION INTRAVENOUS at 13:21

## 2024-11-01 SDOH — ECONOMIC STABILITY: TRANSPORTATION INSECURITY
IN THE PAST 12 MONTHS, HAS LACK OF RELIABLE TRANSPORTATION KEPT YOU FROM MEDICAL APPOINTMENTS, MEETINGS, WORK OR FROM GETTING THINGS NEEDED FOR DAILY LIVING?: NO

## 2024-11-01 SDOH — ECONOMIC STABILITY: TRANSPORTATION INSECURITY
IN THE PAST 12 MONTHS, HAS THE LACK OF TRANSPORTATION KEPT YOU FROM MEDICAL APPOINTMENTS OR FROM GETTING MEDICATIONS?: NO

## 2024-11-01 ASSESSMENT — PAIN DESCRIPTION - PAIN TYPE
TYPE: ACUTE PAIN

## 2024-11-01 ASSESSMENT — LIFESTYLE VARIABLES
HAVE YOU EVER FELT YOU SHOULD CUT DOWN ON YOUR DRINKING: NO
EVER FELT BAD OR GUILTY ABOUT YOUR DRINKING: NO
DOES PATIENT WANT TO STOP DRINKING: NO
AVERAGE NUMBER OF DAYS PER WEEK YOU HAVE A DRINK CONTAINING ALCOHOL: 0
CONSUMPTION TOTAL: NEGATIVE
HAVE PEOPLE ANNOYED YOU BY CRITICIZING YOUR DRINKING: NO
ALCOHOL_USE: NO
EVER HAD A DRINK FIRST THING IN THE MORNING TO STEADY YOUR NERVES TO GET RID OF A HANGOVER: NO
ON A TYPICAL DAY WHEN YOU DRINK ALCOHOL HOW MANY DRINKS DO YOU HAVE: 0
TOTAL SCORE: 0
HOW MANY TIMES IN THE PAST YEAR HAVE YOU HAD 5 OR MORE DRINKS IN A DAY: 0
TOTAL SCORE: 0
TOTAL SCORE: 0

## 2024-11-01 ASSESSMENT — COGNITIVE AND FUNCTIONAL STATUS - GENERAL
SUGGESTED CMS G CODE MODIFIER MOBILITY: CH
SUGGESTED CMS G CODE MODIFIER DAILY ACTIVITY: CH
DAILY ACTIVITIY SCORE: 24
MOBILITY SCORE: 24

## 2024-11-01 ASSESSMENT — ENCOUNTER SYMPTOMS
FLANK PAIN: 1
SHORTNESS OF BREATH: 0
MYALGIAS: 0
CHILLS: 0
NAUSEA: 1
HEADACHES: 0
ABDOMINAL PAIN: 1
DEPRESSION: 0
DIZZINESS: 0
FEVER: 0
BLURRED VISION: 0
VOMITING: 1

## 2024-11-01 ASSESSMENT — SOCIAL DETERMINANTS OF HEALTH (SDOH)
WITHIN THE LAST YEAR, HAVE TO BEEN RAPED OR FORCED TO HAVE ANY KIND OF SEXUAL ACTIVITY BY YOUR PARTNER OR EX-PARTNER?: NO
WITHIN THE PAST 12 MONTHS, YOU WORRIED THAT YOUR FOOD WOULD RUN OUT BEFORE YOU GOT THE MONEY TO BUY MORE: NEVER TRUE
WITHIN THE PAST 12 MONTHS, THE FOOD YOU BOUGHT JUST DIDN'T LAST AND YOU DIDN'T HAVE MONEY TO GET MORE: NEVER TRUE
IN THE PAST 12 MONTHS, HAS THE ELECTRIC, GAS, OIL, OR WATER COMPANY THREATENED TO SHUT OFF SERVICE IN YOUR HOME?: NO
WITHIN THE LAST YEAR, HAVE YOU BEEN KICKED, HIT, SLAPPED, OR OTHERWISE PHYSICALLY HURT BY YOUR PARTNER OR EX-PARTNER?: NO
WITHIN THE LAST YEAR, HAVE YOU BEEN HUMILIATED OR EMOTIONALLY ABUSED IN OTHER WAYS BY YOUR PARTNER OR EX-PARTNER?: NO
WITHIN THE LAST YEAR, HAVE YOU BEEN AFRAID OF YOUR PARTNER OR EX-PARTNER?: NO

## 2024-11-01 ASSESSMENT — PATIENT HEALTH QUESTIONNAIRE - PHQ9
2. FEELING DOWN, DEPRESSED, IRRITABLE, OR HOPELESS: NOT AT ALL
SUM OF ALL RESPONSES TO PHQ9 QUESTIONS 1 AND 2: 0
1. LITTLE INTEREST OR PLEASURE IN DOING THINGS: NOT AT ALL

## 2024-11-01 ASSESSMENT — FIBROSIS 4 INDEX: FIB4 SCORE: 0.63

## 2024-11-01 NOTE — PROGRESS NOTES
Pt admitted to room T419 via transport in hospital bed from ED at 1220. Report received from JO Batista.      Patient reports pain at 3 on a scale of 0-10. Educated patient regarding pharmacologic and non pharmacologic modalities for pain management. Oriented to room call light and smoking policy.  Reviewed plan of care (equipment, incentive spirometer, sequential compression devices, medications, activity, diet, fall precautions, skin care, and pain) with patient and family. Welcome packet given and reviewed with patient, all questions answered. Education provided on oral hygiene program.     AA&Ox4. Denies CP/SOB.  See 2 RN skin note  Tolerating NPO sips diet. Denies N/V.  + void - BM. Last BM PTA  Pt ambulates stand by    All needs met at this time. Call light within reach. Pt calls appropriately. Bed low and locked, non skid socks in place. Hourly rounding in place.

## 2024-11-01 NOTE — ANESTHESIA TIME REPORT
Anesthesia Start and Stop Event Times       Date Time Event    11/1/2024 1315 Ready for Procedure     1317 Anesthesia Start     1419 Anesthesia Stop          Responsible Staff  11/01/24      Name Role Begin End    Zi Estrada M.D. Anesth 1317 1419          Overtime Reason:  no overtime (within assigned shift)    Comments:

## 2024-11-01 NOTE — ED PROVIDER NOTES
ED Provider Note    ED PHYSICIAN NOTE    CHIEF COMPLAINT  Chief Complaint   Patient presents with    Abdominal Pain    Flank Pain     left         EXTERNAL RECORDS REVIEWED  External ED Note from [facility], [place] December 2023 for peritonsillar abscess  HPI/ROS  LIMITATION TO HISTORY   Select: : None  OUTSIDE HISTORIAN(S):  none    Rajiv Johansen is a 49 y.o. male who presents with left-sided flank pain.  Patient reports his symptoms began last night, or mild at first but has become more severe through the night.  Is a sharp pain with some radiation towards the front.  No real alleviating or aggravating factors.  He reports no hematuria or dysuria.  He did start having some nausea and vomiting this morning.  No diarrhea.  No fevers or chills.  No chest pain, cough congestion shortness of breath    PAST MEDICAL HISTORY  Past Medical History:   Diagnosis Date    Pneumothorax 1993    Renal disorder     kidney stones    Skull fracture (HCC) 1983    Stroke (HCC) 2020    residual weakness whole, more so left arm       SOCIAL HISTORY  Social History     Tobacco Use    Smoking status: Every Day     Current packs/day: 0.50     Average packs/day: 0.5 packs/day for 30.8 years (15.4 ttl pk-yrs)     Types: Cigarettes     Start date: 1994    Smokeless tobacco: Never   Substance Use Topics    Alcohol use: Yes     Comment: pt reports distant Hx of alcohol abuse.    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 (Registered Nurse) on 11/01/24 at 1334  Med List Status: Complete     Medication Last Dose Status   acetaminophen (Tylenol) tablet 650 mg  Active   ceFAZolin (Ancef) injection  Active   dexamethasone (Decadron) injection  Active   hydrALAZINE (Apresoline) injection 10 mg  Active   HYDROmorphone (Dilaudid) injection 0.5 mg  Active   IOHEXOL 300 MG/ML INJ SOLN  Active   NS infusion  Active   ondansetron (Zofran  "ODT) dispertab 4 mg  Active   ondansetron (Zofran) syringe/vial injection 4 mg  Active   oxyCODONE immediate release (Roxicodone) tablet 10 mg  Active   oxyCODONE immediate-release (Roxicodone) tablet 5 mg  Active   Pharmacy Consult Request ...Pain Management Review 1 Each  Active   prochlorperazine (Compazine) injection 5-10 mg  Active   promethazine (Phenergan) suppository 12.5-25 mg  Active   promethazine (Phenergan) tablet 12.5-25 mg  Active                  Audit from Redirected Encounters    **Home medications have not yet been reviewed for this encounter**         ALLERGIES  No Known Allergies    PHYSICAL EXAM  VITAL SIGNS: /85   Pulse 60   Temp 36.4 °C (97.5 °F) (Temporal)   Resp 18   Ht 1.727 m (5' 8\")   Wt 71 kg (156 lb 8.4 oz)   SpO2 94%   BMI 23.80 kg/m²    Constitutional: Awake and alert uncomfortable  HENT: Normal inspection, no signs of trauma  Eyes: Normal inspection, Pupils equal, non-icteric  Neck: Grossly normal range of motion. No stridor  Cardiovascular: Regular rate and rhythm, no murmurs.  Symmetric peripheral pulses at radial   Thorax & Lungs: No respiratory distress, No wheezing, No rales, No rhonchi, No chest tenderness.   Abdomen:  soft, non-distended, nontender, no mass  Skin: No obvious rash. Warm. Dry.   Back: No tenderness, No CVA tenderness.   Extremities: No cyanosis, no edema  Neurologic: AO3, Grossly normal,  Psychiatric: Normal affect for situation        DIAGNOSTIC STUDIES / PROCEDURES  LABS/EKG  Labs Reviewed   CBC WITH DIFFERENTIAL - Abnormal; Notable for the following components:       Result Value    MPV 8.9 (*)     Neutrophils-Polys 77.80 (*)     Lymphocytes 11.70 (*)     Neutrophils (Absolute) 7.87 (*)     Monos (Absolute) 0.88 (*)     All other components within normal limits   COMP METABOLIC PANEL - Abnormal; Notable for the following components:    Sodium 134 (*)     Glucose 121 (*)     Bun 24 (*)     All other components within normal limits   URINALYSIS - " Abnormal; Notable for the following components:    Occult Blood Moderate (*)     All other components within normal limits   URINE MICROSCOPIC (W/UA) - Abnormal; Notable for the following components:    WBC 3-5 (*)     RBC 21-50 (*)     All other components within normal limits   LIPASE   ESTIMATED GFR           RADIOLOGY  I have independently interpreted the diagnostic imaging associated with this visit and am waiting the final reading from the radiologist.   My preliminary interpretation is as follows: Large left ureteral stone    CT-RENAL COLIC EVALUATION(A/P W/O)   Final Result   Impression:      1. Moderate left hydronephrosis related to a large 13 x 7 mm proximal left ureter stone near the kidney. Prominent left perinephric inflammation consistent with acute obstruction. No additional stones evident.      2. Normal appendix. No diverticulitis evident.      3. L5-S1 degenerative changes with stenosis on the left.                  DX-PORTABLE FLUOROSCOPY < 1 HOUR Reason For Exam: ABDOMINAL PAIN, FLANK PAIN (left)    (Results Pending)   [id number] VIEW    (Results Pending)         COURSE & MEDICAL DECISION MAKING    INITIAL ASSESSMENT, COURSE AND PLAN  Care Narrative: 6:40 AM  Patient presents with flank pain.  Consideration for ureteral stone, urinary tract infection, electrolyte or metabolic derangement, renal insufficiency, additional consideration for pancreatitis over the location of his pain would be unusual for this, he has no right-sided pain to suggest appendicitis or cholecystitis.  Exam does not suggest obstruction or perforation either.  Have ordered for diagnostic labs, CT, Toradol and Zofran    7:09 AM  Patient is reevaluated, some continued pain after Toradol ordered for fentanyl    8:11 AM  Patient is reevaluated, pain is returning again, would have additional pain medication, updated on results thus far, pending urinalysis    Reevaluation, patient much more comfortable,    10:12 AM  Patient is  reevaluated and is now having return in severe pain.  Have ordered for additional IV medication, plan for hospitalization    Case is discussed with Dr. Galeana for admission    This is discussed with Dr. Hdz from urology.  Will plan for n.p.o., likely OR this afternoon    Interventions  Medications   NS infusion (has no administration in time range)   acetaminophen (Tylenol) tablet 650 mg ( Oral MAR Hold 11/1/24 1253)   Pharmacy Consult Request ...Pain Management Review 1 Each ( Other MAR Hold 11/1/24 1253)   oxyCODONE immediate-release (Roxicodone) tablet 5 mg ( Oral MAR Hold 11/1/24 1253)     Or   oxyCODONE immediate release (Roxicodone) tablet 10 mg ( Oral MAR Hold 11/1/24 1253)     Or   HYDROmorphone (Dilaudid) injection 0.5 mg ( Intravenous MAR Hold 11/1/24 1253)   hydrALAZINE (Apresoline) injection 10 mg ( Intravenous MAR Hold 11/1/24 1253)   ondansetron (Zofran) syringe/vial injection 4 mg ( Intravenous MAR Hold 11/1/24 1253)   ondansetron (Zofran ODT) dispertab 4 mg ( Oral MAR Hold 11/1/24 1253)   promethazine (Phenergan) tablet 12.5-25 mg ( Oral MAR Hold 11/1/24 1253)   promethazine (Phenergan) suppository 12.5-25 mg ( Rectal MAR Hold 11/1/24 1253)   prochlorperazine (Compazine) injection 5-10 mg ( Intravenous MAR Hold 11/1/24 1253)   IOHEXOL 300 MG/ML INJ SOLN (has no administration in time range)   ketorolac (Toradol) 15 MG/ML injection 15 mg (15 mg Intravenous Given 11/1/24 0650)   ondansetron (Zofran) syringe/vial injection 4 mg (4 mg Intravenous Given 11/1/24 0650)   fentaNYL (Sublimaze) injection 100 mcg (100 mcg Intravenous Given 11/1/24 0733)   fentaNYL (Sublimaze) injection 100 mcg (100 mcg Intravenous Given 11/1/24 0830)   HYDROmorphone (Dilaudid) injection 0.5 mg (0.5 mg Intravenous Given 11/1/24 1052)            PROBLEM LIST    # Ureteral stone with hydronephrosis.  Patient with large proximal ureteral stone.  He has had persistent return of pain and spite of multiple doses of IV pain  medication will be hospitalized for further care.  No overt findings of concomitant infection with his urinalysis and no leukocytosis    # Tobacco use. Rajiv Morton M.D. spent greater than 3 minutes with the patient explaining the importance of smoking cessation.         DISPOSITION AND DISCUSSIONS  I have discussed management of the patient with the following physicians and DELGADO's:  as noted above    Patient is admitted in stable condition  FINAL DIAGNOSIS  1. Ureteral stone        2. Hydronephrosis with urinary obstruction due to ureteral calculus               This dictation was created using voice recognition software. The accuracy of the dictation is limited to the abilities of the software. I expect there may be some errors of grammar and possibly content. The nursing notes were reviewed and certain aspects of this information were incorporated into this note.    Electronically signed by: Rajiv Morton M.D., 11/1/2024

## 2024-11-01 NOTE — ANESTHESIA PROCEDURE NOTES
Airway    Date/Time: 11/1/2024 1:23 PM    Performed by: Zi Estrada M.D.  Authorized by: Zi Estrada M.D.    Location:  OR  Urgency:  Elective  Difficult Airway: No    Indications for Airway Management:  Anesthesia      Spontaneous Ventilation: absent    Sedation Level:  Deep  Preoxygenated: Yes    Patient Position:  Sniffing  Final Airway Type:  Endotracheal airway  Final Endotracheal Airway:  ETT  Cuffed: Yes    Technique Used for Successful ETT Placement:  Direct laryngoscopy  Devices/Methods Used in Placement:  Anterior pressure/BURP    Insertion Site:  Oral  Blade Type:  Fior  Laryngoscope Blade/Videolaryngoscope Blade Size:  3  ETT Size (mm):  7.5  Measured from:  Teeth  ETT to Teeth (cm):  23  Placement Verified by: auscultation and capnometry    Cormack-Lehane Classification:  Grade IIb - view of arytenoids or posterior of glottis only  Number of Attempts at Approach:  1

## 2024-11-01 NOTE — OR NURSING
1415 patient arrived from OR, report received, attached to monitoring. VSS, patient oxygenating well on 4 L simple mask, strings on stent visualized with this RN and OR RN, patient asleep     1423 per Dr. Estrada give Toradol 15 mg IV     1435 patient awake, tolerates sips of water returns to sleep     1500 report called to Gifty RN    1518 patient back to room with transport tech, vss, strings continue in place, denies pain and nausea, safety ensured, care transferred

## 2024-11-01 NOTE — PROGRESS NOTES
4 Eyes Skin Assessment Completed by JO Durbin and JO Valdez.    Head WDL  Ears WDL  Nose WDL  Mouth WDL  Neck WDL  Breast/Chest WDL  Shoulder Blades WDL  Spine WDL  (R) Arm/Elbow/Hand WDL  (L) Arm/Elbow/Hand WDL  Abdomen WDL  Groin WDL  Scrotum/Coccyx/Buttocks stent strings, bleeding from urethra post operatively, sacrum red and blanching  (R) Leg WDL  (L) Leg WDL  (R) Heel/Foot/Toe WDL  (L) Heel/Foot/Toe WDL          Devices In Places Blood Pressure Cuff and Pulse Ox      Interventions In Place Pillows, Low Air Loss Mattress, and Heels Loaded W/Pillows    Possible Skin Injury No    Pictures Uploaded Into Epic N/A  Wound Consult Placed N/A  RN Wound Prevention Protocol Ordered No

## 2024-11-01 NOTE — OR SURGEON
Immediate Post OP Note    PreOp Diagnosis: LEFT URETERAL STONE 13MM      PostOp Diagnosis: SAME      Procedure(s):  CYSTOSCOPY, WITH URETERAL STENT INSERTION, CARLITO LASER - Wound Class: Clean    Surgeon(s):  Solitario Cox M.D.    Anesthesiologist/Type of Anesthesia:  Anesthesiologist: Zi Estrada M.D./General    Surgical Staff:  Circulator: Eleanor Hector R.N.; Nella Song R.N.  Laser Staff: Jossie Kelsey; Chris Rodriguez  Scrub Person: Murali Nicolas; Eric Combs  Radiology Technologist: Zi Melara    Specimens removed if any:  * No specimens in log *    Estimated Blood Loss: MIN    Findings: COMPLETE FRAG OF STONE IN LEFT UPPER POLE.  24X6 STENT WITH STRINGS    Complications: NONE        11/1/2024 2:14 PM Solitario Cox M.D.

## 2024-11-01 NOTE — ASSESSMENT & PLAN NOTE
CT showed moderate left hydronephrosis with large 13 x 7 mm proximal left ureteral stone near the kidney with left perinephric inflammation consistent with acute obstruction  -Patient received 2 doses of IV fentanyl, Dilaudid, and Toradol in the ER  Urology consulted  -Keep n.p.o. for surgery today  Pain control  UA negative, denies dysuria or hematuria.  No need for antibiotics at this time

## 2024-11-01 NOTE — ED TRIAGE NOTES
Pt brought into triage via WC for c/o Left flank pain. Pt sts it started to hurt last PM and he drank some cranberry juice and sts it burned in his kidney. Pt denies any urinary symptoms. Pt sts pain is getting worse. Protocol orders placed. Pt is A&O and placed in lobby pending room.

## 2024-11-01 NOTE — PROGRESS NOTES
Virtual Nurse admission and rounding complete. Patient designated caregiver unit clerk asked to bring authorization form.     Round Needs: No needs at this time.

## 2024-11-01 NOTE — ED NOTES
Bedside report received from off going RN/tech: Chel OSORIO, assumed care of patient.  POC discussed with patient. Call light within reach, all needs addressed at this time.       Fall risk interventions in place: Patient's personal possessions are with in their safe reach, Give patient urinal if applicable, and Keep floor surfaces clean and dry (all applicable per Marcus Fall risk assessment)   Continuous monitoring: Pulse Ox or Blood Pressure  IVF/IV medications: Not Applicable   Oxygen: Room Air  Bedside sitter: Not Applicable   Isolation: Not Applicable    Pt resting comfortably.  Respirations even/unlabored.

## 2024-11-01 NOTE — OP REPORT
DATE OF SERVICE:  11/01/2024     NAME OF OPERATION:  Cystoscopy with left ureteroscopy with laser lithotripsy   and left ureteral stent placement.     PREOPERATIVE DIAGNOSIS:  Left proximal ureteral stone, 13 mm.     POSTOPERATIVE DIAGNOSIS:  Left proximal ureteral stone, 13 mm.     PRIMARY SURGEON:  Solitario Cox MD     ANESTHESIOLOGIST:  Zi Estrada MD     FINDINGS:  Complete fragmentation of stone, which was relocated to a left   upper pole caliceal position.  A 24 cm x 6-Argentine stent with strings.     INDICATIONS:  Briefly, the patient is a 49-year-old gentleman who presents to   the Emergency Department with a left renal colic secondary to a 13 mm left   proximal ureteral stone.  He has had trouble with pain management, was   admitted to the hospitalist.  Upon consideration of his options, he elected to   undergo surgical management.  Informed consent was obtained.     OPERATION IN DETAIL:  The patient was taken to the operating room and placed   on the operating table in supine position.  After administration of general   anesthetic, he was placed in lithotomy.  Genitals were prepped and draped   sterilely.  A 22-Argentine cystoscope was passed in the bladder.  Urethra and   prostate were within normal limits, nonobstructing.  In the bladder, no stones   or tumors were identified.  Left ureteral orifice was found fairly close to   the bladder neck and a 0.035 guidewire was passed under fluoroscopic guidance   to the left kidney.  The stone was opaque in the proximal ureter.     Initial attempts to pass a digital flexible disposable ureteroscope were   unsuccessful and a Devon One-Step dilator was then used to gently dilate   the distal ureter without challenge.  Next, the scope passed easily over the   wire to the level of the stone.  The wire was removed and I was able to   negotiate the stone into the renal pelvis and ultimately to a left upper pole   position.     Here, 200 micron holmium laser  fiber was employed to begin dusting the stone   along its periphery.  Ultimately, it fractured into several larger pieces at   its core, which were all worked on in turn such that none appeared larger than   1, possibly 2 mm.  There really did appear to be a very good complete   treatment effect.     The entire length of the ureter was inspected, the scope was removed and a   wire was passed retrograde.  There was no evidence of residual ureteral stone   or ureteral injury.  A decision was made to leave the stent.  A 24 cm x   6-Thai stent was then placed in the usual manner over the wire.  Its   proximal J was seen coiling in the left renal pelvis and its distal J in the   bladder.  Strings were left attached with the intention of being removed in   approximately 5 days' time in the office.  He tolerated the procedure well and   was taken to recovery room in stable condition.        ______________________________  Solitario Cox MD MCM/JUDY    DD:  11/01/2024 14:25  DT:  11/01/2024 15:07    Job#:  [id number]

## 2024-11-01 NOTE — CONSULTS
DATE OF SERVICE:  11/01/2024     UROLOGY CONSULTATION NOTE     Urology service was consulted by Dr. Morton of the emergency department for   advice and opinion regarding this gentleman's left kidney stone.     The patient presents with several day history of acute onset left sided flank   pain.  This is very similar to a pain he had previously, 20 years ago, when he   passed a kidney stone.  No associated fevers or chills.  Imaging in the   emergency department demonstrated a 13 mm solitary left proximal ureteral   stone with moderate hydronephrosis.  His urinalysis is not suggestive of   infection.     For detailed account the patient's past medical, surgical, social history and   review of systems, please see Dr. Morton's dictated history and physical   dated today in the patient's chart.     PHYSICAL EXAMINATION:  VITAL SIGNS:  Temperature 36.4, pulse 60, blood pressure 123/85.  ABDOMEN:  Soft, nontender, nondistended.     LABORATORY DATA:  White blood cell count 10, creatinine 1.06.     DIAGNOSTIC DATA:  CT scan images as above.  Urinalysis notable for some red   cells and just 3-5 white cells, nitrite negative, leukocyte esterase negative,   no bacteria seen.     IMPRESSION AND PLAN:  A 49-year-old gentleman with symptomatic 13 mm left   proximal ureteral stone.  He has been required multiple bouts of pain   medication and was admitted for pain management.  Options were reviewed with   the patient and he is elected to undergo our recommendation for left   ureteroscopic laser lithotripsy and left ureteral stent placement.  I   explained the rationale for this procedure, how it is performed as well as the   associated risks, which include but not limited to bleeding, infection,   injury to local structures, inability to treat the stone, need for further   treatments and absolute need for followup in the setting of placed stent.  He   expresses adequate understanding and wished to  proceed.        ______________________________  Solitario Cox MD MCM/SAEID    DD:  11/01/2024 13:02  DT:  11/01/2024 15:57    Job#:  [id number]

## 2024-11-01 NOTE — ED NOTES
Med rec completed per patient at bedside.    Allergies reviewed with patient.    Outpatient antibiotics within the last 30 days: none.    ANTICOAGULANTS: none.    Patient's preferred pharmacy: [facility].    *Patient denies using any prescription medications at this time. No recent over-the-counter medications.

## 2024-11-01 NOTE — ANESTHESIA PREPROCEDURE EVALUATION
Case: [id number] Date/Time: 11/01/24 1251    Procedure: CYSTOSCOPY, WITH URETERAL STENT INSERTION, CARLITO LASER (Left)    Location: [facility] ROOM 25 / SURGERY SAME DAY [facility]    Surgeons: Solitario Cox M.D.          48yo M w/PMH methamphetamine use, poor historian, presents with nephrolithiasis    Relevant Problems   ANESTHESIA (within normal limits)      PULMONARY (within normal limits)      NEURO   (positive) CVA (cerebral vascular accident) (HCC), >5 years ago pt states pain and weakness in his left upper extremity ever since      CARDIAC (within normal limits)      GI (within normal limits)         (positive) Hydronephrosis with ureteral calculus      ENDO (within normal limits)      Other   (positive) Flank pain  Crystal meth use, pt states last use yesterday     Lab Results   Component Value Date/Time    WBC 10.1 11/01/2024 06:45 AM    RBC 4.71 11/01/2024 06:45 AM    HEMOGLOBIN 15.0 11/01/2024 06:45 AM    HEMATOCRIT 43.9 11/01/2024 06:45 AM    MCV 93.2 11/01/2024 06:45 AM    MCH 31.8 11/01/2024 06:45 AM    MCHC 34.2 11/01/2024 06:45 AM    MPV 8.9 (L) 11/01/2024 06:45 AM    NEUTSPOLYS 77.80 (H) 11/01/2024 06:45 AM    LYMPHOCYTES 11.70 (L) 11/01/2024 06:45 AM    MONOCYTES 8.70 11/01/2024 06:45 AM    EOSINOPHILS 0.90 11/01/2024 06:45 AM    BASOPHILS 0.60 11/01/2024 06:45 AM        Lab Results   Component Value Date/Time    SODIUM 134 (L) 11/01/2024 06:45 AM    POTASSIUM 4.1 11/01/2024 06:45 AM    CHLORIDE 103 11/01/2024 06:45 AM    CO2 20 11/01/2024 06:45 AM    GLUCOSE 121 (H) 11/01/2024 06:45 AM    BUN 24 (H) 11/01/2024 06:45 AM    CREATININE 1.06 11/01/2024 06:45 AM      EKG  No results found for this or any previous visit.    Ambulatory Vitals  Encounter Vitals  Temperature: 36.4 °C (97.5 °F)  Temp src: Temporal  Blood Pressure: 123/85  BP Location: Right, Upper Arm  Patient BP Position: Sitting  Pulse: 60  Respiration: 18  Pulse Oximetry: 94 %  O2 (LPM): 0  O2 Delivery Device: None - Room  "Air  Weight: 71 kg (156 lb 8.4 oz)  Weight Source: (!) Stated Weight  Height: 172.7 cm (5' 8\")  BMI (Calculated): 23.8  Location: Flank  Description: Sore    Physical Exam    Airway   Mallampati: II  TM distance: >3 FB  Neck ROM: full       Cardiovascular - normal exam  Rhythm: regular  Rate: normal     Dental       Very poor dentition     Pulmonary - normal exam  Breath sounds clear to auscultation     Abdominal    Neurological - normal exam                   Anesthesia Plan    ASA 3 (current illicit drug use)   ASA physical status 3 criteria: other (comment)    Plan - general       Airway plan will be ETT          Induction: intravenous    Postoperative Plan: Postoperative administration of opioids is intended.    Pertinent diagnostic labs and testing reviewed    Informed Consent:    Anesthetic plan and risks discussed with patient.    Use of blood products discussed with: patient whom consented to blood products.       Risks of general anesthesia discussed including sore throat, damage to lips/teeth, anaphylaxis/drug reaction, aspiration, awareness, post-op nausea/vomiting, post-op delirium, myocardial infarction, cerebral vascular accident up to and including death.      " Never smoker

## 2024-11-01 NOTE — CONSULTS
UROLOGY CONSULT (dictated)    Requested by:  SUJIT jacobsen  Reason:  13mm left ureteral stone, intractable pain    Recommendations:  to OR for treatment, with CYSTOSCOPY, LEFT URETEROSCOPY, LASER LITHOTRIPSY, STENT.  I have explained to the patient the indications for this procedure, and we have discussed the customary risks, which include but are not limited to, bleeding, infection, damage to local structures.  The patient expresses adequate understanding and gives informed consent.

## 2024-11-01 NOTE — H&P
Hospital Medicine History & Physical Note    Date of Service  11/1/2024    Primary Care Physician  Pcp Pt States None    Consultants  urology    Specialist Names: Dr. Cox    Code Status  Full Code    Chief Complaint  Chief Complaint   Patient presents with    Abdominal Pain    Flank Pain     left       History of Presenting Illness  Rajiv Johansen is a 49 y.o. male who presented 11/1/2024 with severe flank pain.  Patient reports last night he began having left-sided flank pain that was mild but became more severe through the night.  This morning he started having nausea and vomiting due to the pain.  Denied any aggravating or alleviating factors.  He denied any fevers, chills, dysuria or hematuria.  Denies any past medical history of stones.  At time of my evaluation he is having minimal flank pain however got 2 doses of fentanyl, Toradol and Dilaudid.    In the ER patient hypertensive, labs largely unremarkable, sodium 134, UA negative.  CT renal showed moderate left hydronephrosis with a large 13 x 7 mm proximal left ureteral stone near the kidney with left perinephric inflammation consistent with acute obstruction. Discussed with urology plan for OR today keep NPO.    I discussed the plan of care with patient, urology, and ERP .    Review of Systems  Review of Systems   Constitutional:  Positive for malaise/fatigue. Negative for chills and fever.   HENT:  Negative for congestion.    Eyes:  Negative for blurred vision.   Respiratory:  Negative for shortness of breath.    Cardiovascular:  Negative for chest pain and leg swelling.   Gastrointestinal:  Positive for abdominal pain, nausea and vomiting.   Genitourinary:  Positive for flank pain. Negative for dysuria, frequency and hematuria.   Musculoskeletal:  Negative for myalgias.   Skin:  Negative for rash.   Neurological:  Negative for dizziness and headaches.   Psychiatric/Behavioral:  Negative for depression.    All other systems reviewed and are  negative.      Past Medical History  Denies    Surgical History   has a past surgical history that includes hernia repair.     Family History  Denies family history of kidney disease or stones  Family history reviewed with patient. There is no family history that is pertinent to the chief complaint.     Social History   reports that he has been smoking cigarettes. He started smoking about 30 years ago. He has a 15.4 pack-year smoking history. He has never used smokeless tobacco. He reports current alcohol use. He reports that he does not use drugs.    Allergies  No Known Allergies    Medications  Prior to Admission Medications   Prescriptions Last Dose Informant Patient Reported? Taking?   naproxen (NAPROSYN) 500 MG Tab   No No   Sig: Take 1 Tablet by mouth 2 times a day with meals.      Facility-Administered Medications: None       Physical Exam  Temp:  [36.4 °C (97.6 °F)] 36.4 °C (97.6 °F)  Pulse:  [56-70] 58  Resp:  [16-18] 17  BP: (125-170)/() 128/85  SpO2:  [92 %-97 %] 92 %  Blood Pressure: (!) 150/88   Temperature: 36.4 °C (97.6 °F)   Pulse: 61   Respiration: 17   Pulse Oximetry: 93 %       Physical Exam  Vitals and nursing note reviewed.   Constitutional:       General: He is not in acute distress.     Appearance: Normal appearance.   HENT:      Head: Normocephalic and atraumatic.      Right Ear: External ear normal.      Left Ear: External ear normal.      Nose: Nose normal.      Mouth/Throat:      Pharynx: Oropharynx is clear.   Eyes:      Conjunctiva/sclera: Conjunctivae normal.      Pupils: Pupils are equal, round, and reactive to light.   Cardiovascular:      Rate and Rhythm: Normal rate and regular rhythm.      Pulses: Normal pulses.   Pulmonary:      Effort: Pulmonary effort is normal. No respiratory distress.      Breath sounds: Normal breath sounds. No wheezing.   Abdominal:      General: Abdomen is flat. There is no distension.      Palpations: Abdomen is soft.      Tenderness: There is no  "abdominal tenderness. There is left CVA tenderness. There is no guarding.   Musculoskeletal:         General: No swelling. Normal range of motion.      Cervical back: Normal range of motion and neck supple.   Skin:     General: Skin is warm and dry.      Findings: No rash.   Neurological:      General: No focal deficit present.      Mental Status: He is alert and oriented to person, place, and time.      Cranial Nerves: No cranial nerve deficit.   Psychiatric:         Mood and Affect: Mood normal.         Behavior: Behavior normal.         Laboratory:  Recent Labs     11/01/24  0645   WBC 10.1   RBC 4.71   HEMOGLOBIN 15.0   HEMATOCRIT 43.9   MCV 93.2   MCH 31.8   MCHC 34.2   RDW 44.8   PLATELETCT 404   MPV 8.9*     Recent Labs     11/01/24  0645   SODIUM 134*   POTASSIUM 4.1   CHLORIDE 103   CO2 20   GLUCOSE 121*   BUN 24*   CREATININE 1.06   CALCIUM 9.7     Recent Labs     11/01/24  0645   ALTSGPT 17   ASTSGOT 26   ALKPHOSPHAT 63   TBILIRUBIN 0.9   LIPASE 15   GLUCOSE 121*         No results for input(s): \"NTPROBNP\" in the last 72 hours.      No results for input(s): \"TROPONINT\" in the last 72 hours.    Imaging:  CT-RENAL COLIC EVALUATION(A/P W/O)   Final Result   Impression:      1. Moderate left hydronephrosis related to a large 13 x 7 mm proximal left ureter stone near the kidney. Prominent left perinephric inflammation consistent with acute obstruction. No additional stones evident.      2. Normal appendix. No diverticulitis evident.      3. L5-S1 degenerative changes with stenosis on the left.                      no X-Ray or EKG requiring interpretation    Assessment/Plan:  Justification for Admission Status  I anticipate this patient will require at least two midnights for appropriate medical management, necessitating inpatient admission because acute obstruction due to large left ureteral stone causing left hydronephrosis.  Patient with intractable pain required multiple doses of IV pain meds in the ER.  " Urology consulted plan for surgery today.      * Hydronephrosis with ureteral calculus- (present on admission)  Assessment & Plan  CT showed moderate left hydronephrosis with large 13 x 7 mm proximal left ureteral stone near the kidney with left perinephric inflammation consistent with acute obstruction  -Patient received 2 doses of IV fentanyl, Dilaudid, and Toradol in the ER  Urology consulted  -Keep n.p.o. for surgery today  Pain control  UA negative, denies dysuria or hematuria.  No need for antibiotics at this time    Flank pain- (present on admission)  Assessment & Plan  With N/V due to stone   IVF   Supportive care, prn antiemetics         VTE prophylaxis: SCDs/TEDs

## 2024-11-02 VITALS
SYSTOLIC BLOOD PRESSURE: 116 MMHG | BODY MASS INDEX: 23.72 KG/M2 | OXYGEN SATURATION: 96 % | WEIGHT: 156.53 LBS | TEMPERATURE: 98.4 F | HEART RATE: 76 BPM | RESPIRATION RATE: 14 BRPM | HEIGHT: 68 IN | DIASTOLIC BLOOD PRESSURE: 83 MMHG

## 2024-11-02 LAB
ANION GAP SERPL CALC-SCNC: 11 MMOL/L (ref 7–16)
BUN SERPL-MCNC: 24 MG/DL (ref 8–22)
CALCIUM SERPL-MCNC: 9.6 MG/DL (ref 8.5–10.5)
CHLORIDE SERPL-SCNC: 103 MMOL/L (ref 96–112)
CO2 SERPL-SCNC: 21 MMOL/L (ref 20–33)
CREAT SERPL-MCNC: 0.89 MG/DL (ref 0.5–1.4)
ERYTHROCYTE [DISTWIDTH] IN BLOOD BY AUTOMATED COUNT: 45.4 FL (ref 35.9–50)
GFR SERPLBLD CREATININE-BSD FMLA CKD-EPI: 104 ML/MIN/1.73 M 2
GLUCOSE SERPL-MCNC: 117 MG/DL (ref 65–99)
HCT VFR BLD AUTO: 46.9 % (ref 42–52)
HGB BLD-MCNC: 15.7 G/DL (ref 14–18)
MCH RBC QN AUTO: 31.5 PG (ref 27–33)
MCHC RBC AUTO-ENTMCNC: 33.5 G/DL (ref 32.3–36.5)
MCV RBC AUTO: 94.2 FL (ref 81.4–97.8)
PLATELET # BLD AUTO: 416 K/UL (ref 164–446)
PMV BLD AUTO: 8.9 FL (ref 9–12.9)
POTASSIUM SERPL-SCNC: 4.7 MMOL/L (ref 3.6–5.5)
RBC # BLD AUTO: 4.98 M/UL (ref 4.7–6.1)
SODIUM SERPL-SCNC: 135 MMOL/L (ref 135–145)
WBC # BLD AUTO: 9.4 K/UL (ref 4.8–10.8)

## 2024-11-02 PROCEDURE — 80048 BASIC METABOLIC PNL TOTAL CA: CPT

## 2024-11-02 PROCEDURE — 99239 HOSP IP/OBS DSCHRG MGMT >30: CPT | Performed by: INTERNAL MEDICINE

## 2024-11-02 PROCEDURE — 700102 HCHG RX REV CODE 250 W/ 637 OVERRIDE(OP): Performed by: INTERNAL MEDICINE

## 2024-11-02 PROCEDURE — 85027 COMPLETE CBC AUTOMATED: CPT

## 2024-11-02 PROCEDURE — A9270 NON-COVERED ITEM OR SERVICE: HCPCS | Performed by: INTERNAL MEDICINE

## 2024-11-02 RX ORDER — OXYCODONE HYDROCHLORIDE 5 MG/1
5 TABLET ORAL
Qty: 10 TABLET | Refills: 0 | Status: SHIPPED | OUTPATIENT
Start: 2024-11-02 | End: 2024-11-06

## 2024-11-02 RX ORDER — ACETAMINOPHEN 325 MG/1
650 TABLET ORAL EVERY 6 HOURS PRN
Qty: 30 TABLET | Refills: 0 | Status: SHIPPED | OUTPATIENT
Start: 2024-11-02

## 2024-11-02 RX ADMIN — OXYCODONE HYDROCHLORIDE 10 MG: 10 TABLET ORAL at 08:59

## 2024-11-02 ASSESSMENT — ENCOUNTER SYMPTOMS
ABDOMINAL PAIN: 0
FEVER: 0
FLANK PAIN: 1
VOMITING: 0
CHILLS: 0
NAUSEA: 0
BACK PAIN: 0
SHORTNESS OF BREATH: 0

## 2024-11-02 ASSESSMENT — PAIN DESCRIPTION - PAIN TYPE
TYPE: ACUTE PAIN

## 2024-11-02 NOTE — PROGRESS NOTES
Received report from previous shift RN  Assessment complete.  A&O x 4 . Patient calls appropriately.  Patient ambulates with SBA x1  assist.   Patient has 7 /10 pain. Pain managed with prescribed medications.  Denies N&V. Tolerating regular diet.  Skin per flowsheets    + void, - flatus, BM (PTA)   Patient denies SOB. Spo2>95% on RA   SCD's refused.  Patient resting in bed.  Reviewed plan of care with patient. Call light and personal belongings with in reach. Hourly rounding in place. All needs met at this time.

## 2024-11-02 NOTE — CARE PLAN
The patient is Stable - Low risk of patient condition declining or worsening    Shift Goals  Clinical Goals: diet tolerance, pain mgmt  Patient Goals: rest    Progress made toward(s) clinical / shift goals:  Pt. Was educated on diet and pt. Verbalized understanding. Pt was educated on the pharmacological and non-pharmacologicla modalities. Pt. Pain was managed per MAR. By the end of the shift, pt. Pain will be 4/10. Pt was educated on using the IS 10x/hr while awake. Pt scored 3000 mL. Pt. Rested intermittently throughout shift.     Patient is not progressing towards the following goals:

## 2024-11-02 NOTE — PROGRESS NOTES
Discharge orders received.  Patient arrived to the discharge lounge.  PIV removed by floor RN. Meds to beds medications not ordered, prescriptions sent to patient's home pharmacy.  Instructions given, medications reviewed and general discharge education provided to patient.  Follow up appointments discussed.  Patient verbalized understanding of dc instructions and prescriptions.  Patient signed discharge instructions.  Patient verbalized he had all belongings with him. Patient ambulated with steady gait from discharge lounge to private vehicle. Patient left via car to home in stable condition.

## 2024-11-02 NOTE — CARE PLAN
The patient is Stable - Low risk of patient condition declining or worsening    Shift Goals  Clinical Goals: diet tolerance, pain mgmt  Patient Goals: rest    Progress made toward(s) clinical / shift goals:    Problem: Pain - Standard  Goal: Alleviation of pain or a reduction in pain to the patient’s comfort goal  Description: Target End Date:  Prior to discharge or change in level of care    Document on Vitals flowsheet    1.  Document pain using the appropriate pain scale per order or unit policy  2.  Educate and implement non-pharmacologic comfort measures (i.e. relaxation, distraction, massage, cold/heat therapy, etc.)  3.  Pain management medications as ordered  4.  Reassess pain after pain med administration per policy  5.  If opiods administered assess patient's response to pain medication is appropriate per POSS sedation scale  6.  Follow pain management plan developed in collaboration with patient and interdisciplinary team (including palliative care or pain specialists if applicable)  Outcome: Progressing     Problem: Knowledge Deficit - Standard  Goal: Patient and family/care givers will demonstrate understanding of plan of care, disease process/condition, diagnostic tests and medications  Description: Target End Date:  1-3 days or as soon as patient condition allows    Document in Patient Education    1.  Patient and family/caregiver oriented to unit, equipment, visitation policy and means for communicating concern  2.  Complete/review Learning Assessment  3.  Assess knowledge level of disease process/condition, treatment plan, diagnostic tests and medications  4.  Explain disease process/condition, treatment plan, diagnostic tests and medications  Outcome: Progressing

## 2024-11-02 NOTE — PROGRESS NOTES
Note to reader: this note follows the APSO format rather than the historical SOAP format. Assessment and plan located at the top of the note for ease of use.    Chief Complaint  49 y.o. year old male here with nephrosis due to ureteral lithiasis.    Assessment/Plan  Interval History   Left ureteral stone  Left hydronephrosis  -S/p left CULTS  -Stent on string to remain for 5 days postoperatively, our office will schedule removal  -Plan for repeat ultrasound imaging in 6 to 8 weeks, our office will schedule    Urology signing off call with questions or concerns.  Case discussed with patient and Dr. Cox-urology    Patient seen and examined    11/2.  Patient resting comfortably in bed napping.  Reports he is feeling significantly better following surgery.  Continues to report mild left flank pain and mild hematuria, likely from stent in place.  Denies F/C/N/V.  No acute changes overnight.  Vitals and labs are stable.         Review of Systems  Physical Exam   Review of Systems   Constitutional:  Negative for chills, fever and malaise/fatigue.   Respiratory:  Negative for shortness of breath.    Cardiovascular:  Negative for chest pain.   Gastrointestinal:  Negative for abdominal pain, nausea and vomiting.   Genitourinary:  Positive for flank pain and hematuria. Negative for dysuria, frequency and urgency.   Musculoskeletal:  Negative for back pain.     Vitals:    11/01/24 2030 11/01/24 2343 11/02/24 0414 11/02/24 0721   BP: 123/84 139/85 123/83 116/83   Pulse: 74 (!) 52 69 76   Resp: 18 18 17 14   Temp: 36.4 °C (97.5 °F) 36.5 °C (97.7 °F) 37 °C (98.6 °F) 36.9 °C (98.4 °F)   TempSrc: Temporal Temporal Temporal Temporal   SpO2: 94% 95% 96% 96%   Weight:       Height:         Physical Exam  Vitals and nursing note reviewed.   HENT:      Mouth/Throat:      Mouth: Mucous membranes are moist.   Eyes:      Pupils: Pupils are equal, round, and reactive to light.   Pulmonary:      Effort: Pulmonary effort is normal.    Abdominal:      General: Abdomen is flat. There is no distension.      Palpations: Abdomen is soft.      Tenderness: There is no abdominal tenderness.   Skin:     Capillary Refill: Capillary refill takes less than 2 seconds.      Coloration: Skin is not jaundiced or pale.   Neurological:      General: No focal deficit present.      Mental Status: He is alert and oriented to person, place, and time.          Hematology Chemistry   Lab Results   Component Value Date/Time    WBC 9.4 11/02/2024 01:44 AM    HEMOGLOBIN 15.7 11/02/2024 01:44 AM    HEMATOCRIT 46.9 11/02/2024 01:44 AM    PLATELETCT 416 11/02/2024 01:44 AM     Lab Results   Component Value Date/Time    SODIUM 135 11/02/2024 01:44 AM    POTASSIUM 4.7 11/02/2024 01:44 AM    CHLORIDE 103 11/02/2024 01:44 AM    CO2 21 11/02/2024 01:44 AM    GLUCOSE 117 (H) 11/02/2024 01:44 AM    BUN 24 (H) 11/02/2024 01:44 AM    CREATININE 0.89 11/02/2024 01:44 AM         Labs not explicitly included in this progress note were reviewed by the author.   Radiology/imaging not explicitly included in this progress note was reviewed by the author.     Core Measures

## 2024-11-02 NOTE — DISCHARGE SUMMARY
Discharge Summary    CHIEF COMPLAINT ON ADMISSION  Chief Complaint   Patient presents with    Abdominal Pain    Flank Pain     left       Reason for Admission  Lower Back pain     Admission Date  11/1/2024    CODE STATUS  Full Code    HPI & HOSPITAL COURSE    Rajiv Johansen is a 49 y.o. male who presented 11/1/2024 with severe flank pain.  Patient reports last night he began having left-sided flank pain that was mild but became more severe through the night.  This morning he started having nausea and vomiting due to the pain.  Denied any aggravating or alleviating factors.  He denied any fevers, chills, dysuria or hematuria.  Denies any past medical history of stones.  At time of my evaluation he is having minimal flank pain however got 2 doses of fentanyl, Toradol and Dilaudid.     In the ER patient hypertensive, labs largely unremarkable, sodium 134, UA negative.  CT renal showed moderate left hydronephrosis with a large 13 x 7 mm proximal left ureteral stone near the kidney with left perinephric inflammation consistent with acute obstruction.     Patient was seen by urology and had cystoscopy with left ureteroscopy with laser lithotripsy and left ureteral stent placement.  Patient tolerated procedure well.  He has some residual pain.  Patient reports passing some stones this a.m.  He is urinating well.  He has been cleared by urology for discharge and outpatient follow-up for stent removal.        Therefore, he is discharged in good and stable condition to home with close outpatient follow-up.    The patient recovered much more quickly than anticipated on admission.    Discharge Date  11/2/24    FOLLOW UP ITEMS POST DISCHARGE  Pcp in 1 week  Urology as scheduled    DISCHARGE DIAGNOSES  Principal Problem:    Hydronephrosis with ureteral calculus (POA: Yes)  Active Problems:    Flank pain (POA: Yes)    CVA (cerebral vascular accident) (HCC) (POA: Unknown)    Illicit drug use (POA: Unknown)  Resolved Problems:     * No resolved hospital problems. *      FOLLOW UP  No future appointments.  No follow-up provider specified.    MEDICATIONS ON DISCHARGE     Medication List        START taking these medications        Instructions   acetaminophen 325 MG Tabs  Commonly known as: Tylenol   Take 2 Tablets by mouth every 6 hours as needed for Mild Pain, Moderate Pain or Fever.  Dose: 650 mg     oxyCODONE immediate-release 5 MG Tabs  Commonly known as: Roxicodone   Take 1 Tablet by mouth every 3 hours as needed for Severe Pain for up to 4 days.  Dose: 5 mg              Allergies  No Known Allergies    DIET  Orders Placed This Encounter   Procedures    Diet Order Diet: Regular     Standing Status:   Standing     Number of Occurrences:   1     Order Specific Question:   Diet:     Answer:   Regular [1]       ACTIVITY  As tolerated.  Weight bearing as tolerated    CONSULTATIONS  urology    PROCEDURES  Cystoscopy and lithotripsy and stent placement    LABORATORY  Lab Results   Component Value Date    SODIUM 135 11/02/2024    POTASSIUM 4.7 11/02/2024    CHLORIDE 103 11/02/2024    CO2 21 11/02/2024    GLUCOSE 117 (H) 11/02/2024    BUN 24 (H) 11/02/2024    CREATININE 0.89 11/02/2024        Lab Results   Component Value Date    WBC 9.4 11/02/2024    HEMOGLOBIN 15.7 11/02/2024    HEMATOCRIT 46.9 11/02/2024    PLATELETCT 416 11/02/2024        Total time of the discharge process exceeds 42 minutes.

## 2024-11-02 NOTE — CARE PLAN
The patient is Stable - Low risk of patient condition declining or worsening    Shift Goals  Clinical Goals: monitor voiding, increase OOB activity, diet tolerance  Patient Goals: sleep    Progress made toward(s) clinical / shift goals:      Patient slept intermittently throughout shift. Patient complained of minimal pain throughout shift. Patient voided once post procedure. Patient encouraged to increase OOB activity.       Problem: Pain - Standard  Goal: Alleviation of pain or a reduction in pain to the patient’s comfort goal  11/1/2024 1842 by Gifty Srinivasan, R.N.  Outcome: Progressing  11/1/2024 1842 by Gifty Srinivasan R.N.  Outcome: Progressing     Problem: Knowledge Deficit - Standard  Goal: Patient and family/care givers will demonstrate understanding of plan of care, disease process/condition, diagnostic tests and medications  11/1/2024 1842 by Gifty Srinivasan, R.N.  Outcome: Progressing  11/1/2024 1842 by Gifty Srinivasan, R.N.  Outcome: Progressing     Problem: Fluid Volume  Goal: Fluid volume balance will be maintained  Outcome: Progressing

## 2024-11-11 ASSESSMENT — PAIN SCALES - GENERAL: PAIN_LEVEL: 4

## 2024-11-12 NOTE — ANESTHESIA POSTPROCEDURE EVALUATION
Patient: Rajiv Johansen    Procedure Summary       Date: 11/01/24 Room / Location: [facility] ROOM 25 / SURGERY SAME DAY [facility]    Anesthesia Start: 1317 Anesthesia Stop: 1419    Procedure: CYSTOSCOPY, WITH URETERAL STENT INSERTION, CARLITO LASER (Left: Ureter) Diagnosis: (left ureteral stone)    Surgeons: Solitario Cox M.D. Responsible Provider: Zi Estrada M.D.    Anesthesia Type: general ASA Status: 1            Final Anesthesia Type: general  Last vitals  /83       Temp 36.9       Pulse 76      Resp 14       SpO2 96         Anesthesia Post Evaluation    Patient location during evaluation: PACU  Patient participation: complete - patient participated  Level of consciousness: awake and alert  Pain score: 4    Airway patency: patent  Anesthetic complications: no  Cardiovascular status: hemodynamically stable  Respiratory status: acceptable  Hydration status: euvolemic    PONV: none          No notable events documented.

## (undated) DEVICE — TOWEL STOP TIMEOUT SAFETY FLAG (40EA/CA)

## (undated) DEVICE — SCOPE DIGITAL URETEROSCOPE DISPOSABLE (10EA/PK)

## (undated) DEVICE — TUBE CONNECTING SUCTION - CLEAR PLASTIC STERILE 72 IN (50EA/CA)

## (undated) DEVICE — CANNULA O2 COMFORT SOFT EAR ADULT 7 FT TUBING (50/CA)

## (undated) DEVICE — CANISTER SUCTION RIGID RED 1500CC (40EA/CA)

## (undated) DEVICE — SENSOR OXIMETER ADULT SPO2 RD SET (20EA/BX)

## (undated) DEVICE — JELLY SURGILUBE STERILE TUBE 4.25 OZ (1/EA)

## (undated) DEVICE — MASK OXYGEN VNYL ADLT MED CONC WITH 7 FOOT TUBING - (50EA/CA)

## (undated) DEVICE — TUBE E-T HI-LO CUFF 8.0MM (10EA/PK)

## (undated) DEVICE — GOWN WARMING STANDARD FLEX - (30/CA)

## (undated) DEVICE — FIBER LASER MOSES 200 UM (1/EA)

## (undated) DEVICE — ELECTRODE DUAL RETURN W/ CORD - (50/PK)

## (undated) DEVICE — SPONGE GAUZE STER 4X4 8-PL - (2/PK 50PK/BX 12BX/CS)

## (undated) DEVICE — SODIUM CHL IRRIGATION 0.9% 1000ML (12EA/CA)

## (undated) DEVICE — SET IRRIGATION CYSTOSCOPY TUBE L80 IN (20EA/CA)

## (undated) DEVICE — GLOVE BIOGEL SZ 7.5 SURGICAL PF LTX - (50PR/BX 4BX/CA)

## (undated) DEVICE — SLEEVE VASO DVT COMPRESSION CALF MED - (10PR/CA)

## (undated) DEVICE — CANISTER SUCTION 3000ML MECHANICAL FILTER AUTO SHUTOFF MEDI-VAC NONSTERILE LF DISP (40EA/CA)

## (undated) DEVICE — KIT  I.V. START (100EA/CA)

## (undated) DEVICE — TUBING CLEARLINK DUO-VENT - C-FLO (48EA/CA)

## (undated) DEVICE — Device

## (undated) DEVICE — WIRE GUIDE SENSOR DUAL FLEX - 5/BX

## (undated) DEVICE — SUCTION INSTRUMENT YANKAUER BULBOUS TIP W/O VENT (50EA/CA)

## (undated) DEVICE — SET LEADWIRE 5 LEAD BEDSIDE DISPOSABLE ECG (1SET OF 5/EA)